# Patient Record
Sex: MALE | Race: BLACK OR AFRICAN AMERICAN | NOT HISPANIC OR LATINO
[De-identification: names, ages, dates, MRNs, and addresses within clinical notes are randomized per-mention and may not be internally consistent; named-entity substitution may affect disease eponyms.]

---

## 2018-08-07 PROBLEM — Z00.00 ENCOUNTER FOR PREVENTIVE HEALTH EXAMINATION: Status: ACTIVE | Noted: 2018-08-07

## 2018-09-04 ENCOUNTER — RX RENEWAL (OUTPATIENT)
Age: 73
End: 2018-09-04

## 2018-10-02 ENCOUNTER — APPOINTMENT (OUTPATIENT)
Dept: ENDOCRINOLOGY | Facility: CLINIC | Age: 73
End: 2018-10-02
Payer: COMMERCIAL

## 2018-10-02 VITALS
HEART RATE: 84 BPM | DIASTOLIC BLOOD PRESSURE: 78 MMHG | BODY MASS INDEX: 29.63 KG/M2 | SYSTOLIC BLOOD PRESSURE: 146 MMHG | HEIGHT: 70 IN | WEIGHT: 207 LBS

## 2018-10-02 DIAGNOSIS — Z86.69 PERSONAL HISTORY OF OTHER DISEASES OF THE NERVOUS SYSTEM AND SENSE ORGANS: ICD-10-CM

## 2018-10-02 DIAGNOSIS — Z86.19 PERSONAL HISTORY OF OTHER INFECTIOUS AND PARASITIC DISEASES: ICD-10-CM

## 2018-10-02 LAB
GLUCOSE BLDC GLUCOMTR-MCNC: 176
PODIATRY EVAL: NORMAL

## 2018-10-02 PROCEDURE — 82962 GLUCOSE BLOOD TEST: CPT

## 2018-10-02 PROCEDURE — 99214 OFFICE O/P EST MOD 30 MIN: CPT | Mod: 25

## 2018-10-02 PROCEDURE — 95251 CONT GLUC MNTR ANALYSIS I&R: CPT

## 2018-10-02 RX ORDER — HYDRALAZINE HYDROCHLORIDE 100 MG/1
100 TABLET ORAL 3 TIMES DAILY
Refills: 0 | Status: ACTIVE | COMMUNITY

## 2018-10-02 RX ORDER — AMLODIPINE BESYLATE 10 MG/1
10 TABLET ORAL DAILY
Refills: 0 | Status: ACTIVE | COMMUNITY

## 2018-10-05 ENCOUNTER — APPOINTMENT (OUTPATIENT)
Dept: ENDOCRINOLOGY | Facility: CLINIC | Age: 73
End: 2018-10-05

## 2018-10-09 ENCOUNTER — RESULT REVIEW (OUTPATIENT)
Age: 73
End: 2018-10-09

## 2018-11-27 ENCOUNTER — APPOINTMENT (OUTPATIENT)
Dept: ENDOCRINOLOGY | Facility: CLINIC | Age: 73
End: 2018-11-27
Payer: COMMERCIAL

## 2018-11-27 PROCEDURE — 95251 CONT GLUC MNTR ANALYSIS I&R: CPT

## 2018-11-27 PROCEDURE — G0108 DIAB MANAGE TRN  PER INDIV: CPT

## 2018-11-28 ENCOUNTER — RESULT CHARGE (OUTPATIENT)
Age: 73
End: 2018-11-28

## 2018-12-26 ENCOUNTER — APPOINTMENT (OUTPATIENT)
Dept: ENDOCRINOLOGY | Facility: CLINIC | Age: 73
End: 2018-12-26
Payer: COMMERCIAL

## 2018-12-26 PROCEDURE — G0108 DIAB MANAGE TRN  PER INDIV: CPT

## 2019-01-07 ENCOUNTER — RX RENEWAL (OUTPATIENT)
Age: 74
End: 2019-01-07

## 2019-01-08 ENCOUNTER — RECORD ABSTRACTING (OUTPATIENT)
Age: 74
End: 2019-01-08

## 2019-01-08 DIAGNOSIS — Z78.9 OTHER SPECIFIED HEALTH STATUS: ICD-10-CM

## 2019-01-08 DIAGNOSIS — Z83.3 FAMILY HISTORY OF DIABETES MELLITUS: ICD-10-CM

## 2019-01-16 ENCOUNTER — APPOINTMENT (OUTPATIENT)
Dept: ENDOCRINOLOGY | Facility: CLINIC | Age: 74
End: 2019-01-16
Payer: MEDICARE

## 2019-01-16 VITALS
WEIGHT: 212 LBS | DIASTOLIC BLOOD PRESSURE: 70 MMHG | HEART RATE: 69 BPM | BODY MASS INDEX: 30.35 KG/M2 | HEIGHT: 70 IN | SYSTOLIC BLOOD PRESSURE: 140 MMHG

## 2019-01-16 LAB — GLUCOSE BLDC GLUCOMTR-MCNC: 191

## 2019-01-16 PROCEDURE — 95251 CONT GLUC MNTR ANALYSIS I&R: CPT

## 2019-01-16 PROCEDURE — 99214 OFFICE O/P EST MOD 30 MIN: CPT | Mod: 25

## 2019-01-16 NOTE — HISTORY OF PRESENT ILLNESS
[FreeTextEntry1] : Feels well, no complaints today\par \par Current DM meds:\par Tresiba 20 units\par Novolog scale at meals- <100 +10 units, 101-150+12 units, 151-200+14 units, 201-250+16 units, 251-300 +18 units and over 300 +20 units\par \par \par SMBG: Izabel personal meter, see below\par \par Diet: trying to watch diet, decreased portion of starch/carb\par \par Exercise: weights\par \par Complications: (+) CKD, (+) retinopathy, (+) neuropathy\par following with Dr Hidalgo (renal) and Dr nely Villaseñor (retina) and podiatry (Dr Cannon)\par

## 2019-01-16 NOTE — PHYSICAL EXAM
[Alert] : alert [No Acute Distress] : no acute distress [Well Nourished] : well nourished [Well Developed] : well developed [Normal Sclera/Conjunctiva] : normal sclera/conjunctiva [EOMI] : extra ocular movement intact [Normal Rate and Effort] : normal respiratory rhythm and effort [Clear to Auscultation] : lungs were clear to auscultation bilaterally [Normal Rate] : heart rate was normal  [Normal S1, S2] : normal S1 and S2 [Normal Bowel Sounds] : normal bowel sounds [Not Tender] : non-tender [Soft] : abdomen soft [Not Distended] : not distended [Normal Gait] : normal gait [No Rash] : no rash [Cranial Nerves Intact] : cranial nerves 2-12 were intact [Normal Reflexes] : deep tendon reflexes were 2+ and symmetric [Oriented x3] : oriented to person, place, and time [Normal Insight/Judgement] : insight and judgment were intact [Normal Affect] : the affect was normal [Normal Mood] : the mood was normal [No Edema] : there was no peripheral edema

## 2019-01-16 NOTE — REVIEW OF SYSTEMS
[Visual Field Defect] : visual field defect [Blurry Vision] : blurred vision [Polyuria] : polyuria [Nocturia] : nocturia [Tremors] : tremors [Pain/Numbness of Digits] : pain/numbness of digits [Fatigue] : fatigue [Recent Weight Gain (___ Lbs)] : recent [unfilled] ~Ulb weight gain [Recent Weight Loss (___ Lbs)] : no recent weight loss [Chest Pain] : no chest pain [Palpitations] : no palpitations [Shortness Of Breath] : no shortness of breath [SOB on Exertion] : no shortness of breath during exertion [Nausea] : no nausea [Vomiting] : no vomiting was observed [Abdominal Pain] : no abdominal pain [Muscle Cramps] : no muscle cramps [Myalgia] : no myalgia  [Depression] : no depression [Anxiety] : no anxiety [Polydipsia] : no polydipsia [FreeTextEntry3] : floaters [FreeTextEntry8] : improving

## 2019-01-16 NOTE — ASSESSMENT
[FreeTextEntry1] : 1. 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, neuropathy and retinopathy - suboptimal glycemic control, Izabel personal interpretation: not scanning enough - incomplete data, occasional fasting hypoglycemia, hyperglycemia towards end of the day\par scan Izabel sensor more!!!\par Tresiba 16 units\par Novolog scale at Breakfast\par <100 +10 units\par 101-150+12 units, \par 151-200+14 units,\par 201-250+16 units,\par 251-300 +18 units \par over 300 +20 units\par \par Novolog scale at Lunch and Dinner\par <100 +14 units\par 101-150+16 units, \par 151-200+18 units,\par 201-250+20 units,\par 251-300 +22 units \par over 300 +24 units\par \par instructions printed for pt\par cont Izabel personal meter\par updated A1c\par \par 2. HLD - updated labs needed\par 3. HTN - cont BP meds, f/u renal

## 2019-01-21 ENCOUNTER — RX CHANGE (OUTPATIENT)
Age: 74
End: 2019-01-21

## 2019-01-21 RX ORDER — FLASH GLUCOSE SENSOR
KIT MISCELLANEOUS
Qty: 9 | Refills: 2 | Status: DISCONTINUED | COMMUNITY
Start: 2018-10-02 | End: 2019-01-21

## 2019-02-25 ENCOUNTER — RX RENEWAL (OUTPATIENT)
Age: 74
End: 2019-02-25

## 2019-04-29 ENCOUNTER — APPOINTMENT (OUTPATIENT)
Dept: ENDOCRINOLOGY | Facility: CLINIC | Age: 74
End: 2019-04-29
Payer: COMMERCIAL

## 2019-04-29 VITALS
HEART RATE: 70 BPM | HEIGHT: 70 IN | SYSTOLIC BLOOD PRESSURE: 140 MMHG | DIASTOLIC BLOOD PRESSURE: 74 MMHG | BODY MASS INDEX: 29.06 KG/M2 | WEIGHT: 203 LBS

## 2019-04-29 LAB — GLUCOSE BLDC GLUCOMTR-MCNC: 197

## 2019-04-29 PROCEDURE — 99214 OFFICE O/P EST MOD 30 MIN: CPT | Mod: 25

## 2019-04-29 PROCEDURE — 95251 CONT GLUC MNTR ANALYSIS I&R: CPT

## 2019-04-29 PROCEDURE — 82962 GLUCOSE BLOOD TEST: CPT

## 2019-07-15 ENCOUNTER — RX RENEWAL (OUTPATIENT)
Age: 74
End: 2019-07-15

## 2019-08-13 LAB
HBA1C MFR BLD HPLC: 8.7
LDLC SERPL DIRECT ASSAY-MCNC: 91

## 2019-08-14 ENCOUNTER — APPOINTMENT (OUTPATIENT)
Dept: ENDOCRINOLOGY | Facility: CLINIC | Age: 74
End: 2019-08-14
Payer: COMMERCIAL

## 2019-08-14 VITALS
DIASTOLIC BLOOD PRESSURE: 68 MMHG | SYSTOLIC BLOOD PRESSURE: 112 MMHG | HEIGHT: 70 IN | HEART RATE: 78 BPM | WEIGHT: 200 LBS | BODY MASS INDEX: 28.63 KG/M2 | OXYGEN SATURATION: 98 %

## 2019-08-14 LAB — GLUCOSE BLDC GLUCOMTR-MCNC: 274

## 2019-08-14 PROCEDURE — 36415 COLL VENOUS BLD VENIPUNCTURE: CPT

## 2019-08-14 PROCEDURE — 82962 GLUCOSE BLOOD TEST: CPT

## 2019-08-14 PROCEDURE — 99214 OFFICE O/P EST MOD 30 MIN: CPT | Mod: 25

## 2019-08-14 RX ORDER — RIVAROXABAN 20 MG/1
20 TABLET, FILM COATED ORAL DAILY
Refills: 0 | Status: DISCONTINUED | COMMUNITY
End: 2019-08-14

## 2019-08-14 NOTE — DATA REVIEWED
[FreeTextEntry1] : Labs 1/26/19\par A1c 8.6\par TSH 1.130\par urine microalb/Cr 25\par LDL 94\par \par Labs 5/6/19\par Gluc 193, A1c 8.7%\par Cr 1.41, GFR 50\par LDL 91, HDL 40, Tg 149

## 2019-08-14 NOTE — PHYSICAL EXAM
[Alert] : alert [No Acute Distress] : no acute distress [Well Nourished] : well nourished [Well Developed] : well developed [Normal Sclera/Conjunctiva] : normal sclera/conjunctiva [Normal Rate and Effort] : normal respiratory rhythm and effort [EOMI] : extra ocular movement intact [Clear to Auscultation] : lungs were clear to auscultation bilaterally [Normal Rate] : heart rate was normal  [Normal S1, S2] : normal S1 and S2 [No Edema] : there was no peripheral edema [Normal Bowel Sounds] : normal bowel sounds [Not Tender] : non-tender [Not Distended] : not distended [Soft] : abdomen soft [Normal Gait] : normal gait [No Rash] : no rash [Normal Reflexes] : deep tendon reflexes were 2+ and symmetric [Cranial Nerves Intact] : cranial nerves 2-12 were intact [Oriented x3] : oriented to person, place, and time [Normal Insight/Judgement] : insight and judgment were intact [Normal Affect] : the affect was normal [Normal Mood] : the mood was normal

## 2019-08-14 NOTE — REVIEW OF SYSTEMS
[Fatigue] : fatigue [Visual Field Defect] : visual field defect [Blurry Vision] : blurred vision [Polyuria] : polyuria [Nocturia] : nocturia [Tremors] : tremors [Pain/Numbness of Digits] : pain/numbness of digits [Stress] : stress [Recent Weight Gain (___ Lbs)] : no recent weight gain [Recent Weight Loss (___ Lbs)] : no recent weight loss [Chest Pain] : no chest pain [Palpitations] : no palpitations [Shortness Of Breath] : no shortness of breath [SOB on Exertion] : no shortness of breath during exertion [Nausea] : no nausea [Vomiting] : no vomiting was observed [Abdominal Pain] : no abdominal pain [Muscle Cramps] : no muscle cramps [Myalgia] : no myalgia  [Depression] : no depression [Anxiety] : no anxiety [Polydipsia] : no polydipsia [FreeTextEntry3] : floaters

## 2019-08-14 NOTE — ASSESSMENT
[FreeTextEntry1] : 1. 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 HLD - updated labs needed\par 3. HTN - cont BP meds

## 2019-08-14 NOTE — HISTORY OF PRESENT ILLNESS
[FreeTextEntry1] : recent cataract surgery\par \par Current DM meds:\par Tresiba 15-16 units\par Novolog scale at Breakfast, Lunch and DInner\par <100 +5 units\par 101-150 +10 units, \par 151-200+14 units,\par 201-250 +14-16 units,\par 251-300 +16-18 units \par over 300 +18 units\par \par SMBG: no logs/meter/no Izabel, FS higher lately, some 300's\par \par Diet BF 9-10 am, lunch 8pm. dinner 11:30 pm. has not been watching diet\par Exercise: weights\par \par Complications: (+) CKD, (+) retinopathy, (+) neuropathy\par following with Dr Hidalgo (renal) and Dr Anu Villaseñor (retina) and podiatry (Dr Cannon)\par

## 2019-08-15 ENCOUNTER — MEDICATION RENEWAL (OUTPATIENT)
Age: 74
End: 2019-08-15

## 2019-08-15 LAB
ALBUMIN SERPL ELPH-MCNC: 4.1 G/DL
ALP BLD-CCNC: 108 U/L
ALT SERPL-CCNC: 10 U/L
ANION GAP SERPL CALC-SCNC: 14 MMOL/L
AST SERPL-CCNC: 12 U/L
BILIRUB SERPL-MCNC: 0.3 MG/DL
BUN SERPL-MCNC: 13 MG/DL
CALCIUM SERPL-MCNC: 9.5 MG/DL
CHLORIDE SERPL-SCNC: 100 MMOL/L
CHOLEST SERPL-MCNC: 199 MG/DL
CHOLEST/HDLC SERPL: 5.4 RATIO
CO2 SERPL-SCNC: 28 MMOL/L
CREAT SERPL-MCNC: 1.29 MG/DL
ESTIMATED AVERAGE GLUCOSE: 229 MG/DL
GLUCOSE SERPL-MCNC: 296 MG/DL
HBA1C MFR BLD HPLC: 9.6 %
HDLC SERPL-MCNC: 37 MG/DL
LDLC SERPL CALC-MCNC: 135 MG/DL
POTASSIUM SERPL-SCNC: 4.8 MMOL/L
PROT SERPL-MCNC: 7.7 G/DL
SODIUM SERPL-SCNC: 142 MMOL/L
TRIGL SERPL-MCNC: 137 MG/DL

## 2019-08-22 ENCOUNTER — RESULT CHARGE (OUTPATIENT)
Age: 74
End: 2019-08-22

## 2019-09-03 ENCOUNTER — RX RENEWAL (OUTPATIENT)
Age: 74
End: 2019-09-03

## 2019-09-25 ENCOUNTER — RX RENEWAL (OUTPATIENT)
Age: 74
End: 2019-09-25

## 2019-12-02 ENCOUNTER — APPOINTMENT (OUTPATIENT)
Dept: ENDOCRINOLOGY | Facility: CLINIC | Age: 74
End: 2019-12-02

## 2019-12-09 ENCOUNTER — APPOINTMENT (OUTPATIENT)
Dept: ENDOCRINOLOGY | Facility: CLINIC | Age: 74
End: 2019-12-09

## 2020-02-05 ENCOUNTER — RX RENEWAL (OUTPATIENT)
Age: 75
End: 2020-02-05

## 2020-02-20 ENCOUNTER — RX RENEWAL (OUTPATIENT)
Age: 75
End: 2020-02-20

## 2020-03-09 LAB — HBA1C MFR BLD HPLC: 10.7

## 2020-03-10 ENCOUNTER — APPOINTMENT (OUTPATIENT)
Dept: ENDOCRINOLOGY | Facility: CLINIC | Age: 75
End: 2020-03-10
Payer: MEDICARE

## 2020-03-10 ENCOUNTER — RX RENEWAL (OUTPATIENT)
Age: 75
End: 2020-03-10

## 2020-03-10 VITALS
WEIGHT: 214 LBS | DIASTOLIC BLOOD PRESSURE: 68 MMHG | SYSTOLIC BLOOD PRESSURE: 122 MMHG | HEIGHT: 70 IN | BODY MASS INDEX: 30.64 KG/M2 | HEART RATE: 76 BPM

## 2020-03-10 LAB — GLUCOSE BLDC GLUCOMTR-MCNC: 250

## 2020-03-10 PROCEDURE — 82962 GLUCOSE BLOOD TEST: CPT

## 2020-03-10 PROCEDURE — 99214 OFFICE O/P EST MOD 30 MIN: CPT | Mod: 25

## 2020-03-10 NOTE — PHYSICAL EXAM
[Alert] : alert [No Acute Distress] : no acute distress [Well Nourished] : well nourished [Well Developed] : well developed [Normal Sclera/Conjunctiva] : normal sclera/conjunctiva [Normal Hearing] : hearing was normal [Supple] : the neck was supple [Normal Rate and Effort] : normal respiratory rhythm and effort [No Accessory Muscle Use] : no accessory muscle use [Clear to Auscultation] : lungs were clear to auscultation bilaterally [Normal Rate] : heart rate was normal  [Normal S1, S2] : normal S1 and S2 [Regular Rhythm] : with a regular rhythm [No Edema] : there was no peripheral edema [Not Tender] : non-tender [Soft] : abdomen soft [Post Cervical Nodes] : posterior cervical nodes [Anterior Cervical Nodes] : anterior cervical nodes [Normal] : normal and non tender [Normal Gait] : normal gait [No Rash] : no rash [Acanthosis Nigricans] : no acanthosis nigricans [No Tremors] : no tremors [Oriented x3] : oriented to person, place, and time

## 2020-03-10 NOTE — ASSESSMENT
[FreeTextEntry1] : T2DM\par -Izabel to be downloaded tomorrow it is clear that there needs to be insulin changes yet unsure of what time of day his problems are\par -Would benefit from a CDE apt as he is a vegetarian and has limited options and leads a carb heavy diet\par -Increase exercise as tolerated, goal of 30 mins a day 5x a week\par -Continue follow up with ophtho, podiatry, and renal\par \par HTN\par Continue regimen \par BP stable in office \par \par HLD\par Continue statin, fasting labs to be done\par \par Pt to come to office tomorrow for izabel download and fasting labs\par RTO in 2 months

## 2020-03-10 NOTE — HISTORY OF PRESENT ILLNESS
[FreeTextEntry1] : T2DM\par Severity: uncontrolled \par Duration: about 10-12 years\par Modifying Factors: on insulin for approx 5 years \par \par SMBG\par Has maday but did not bring  so unable to do download \par FS in office 250- had cup of tea and pastry for breakfast \par \par Endorses lows after he exercises, as low as 70 \par \par A1C worse 10.7\par \par Current drug regimen\par Tresiba 20 units daily\par Novolog scale at Breakfast, Lunch and DInner\par <100 +5 units\par 101-150 +10 units, \par 151-200+14 units,\par 201-250 +14-16 units,\par 251-300 +16-18 units \par over 300 +18 units\par \par Eye exam: recently saw ophtho 2/2020, +retinopathy, +cataract surgery \par Foot exam: last visit 2019- endorses nueorpahty, takes gabapentin \par Kidney disease: +CKD, following with Dr. Hidalgo \par Heart disease: sees cardiology \par following with Dr Hidalgo (renal) and Dr Anu Villaseñor (retina) and podiatry (Dr Cannon)\par \par \par Weight: gained 14 lbs since Aug\par Diet: pt cooks\par B: [place] toast\par L: fish sandwhich, tuna fish, potatoes \par D: fish, starch, veggies\par S: chips, pastry\par D: Diet coke, water, tea \par pt is vegetarian so his diet is starch heavy\par Exercise: walks on treadmill\par \par Smoking: denies \par \par HTN\par BP in office 122/68\par Amlodipine 10 mg daily\par Hydralazine 100 mg TID\par \par HLD\par Atorvastatin 10 mg daily-unsure if he is taking

## 2020-03-10 NOTE — REVIEW OF SYSTEMS
[Fatigue] : fatigue [Recent Weight Gain (___ Lbs)] : recent [unfilled] ~Ulb weight gain [Visual Field Defect] : visual field defect [Blurry Vision] : blurred vision [Diarrhea] : diarrhea [Polyuria] : polyuria [Nocturia] : nocturia [Tremors] : tremors [Depression] : depression [Anxiety] : anxiety [Dysphagia] : no dysphagia [Dysphonia] : no dysphonia [Neck Pain] : no neck pain [Chest Pain] : no chest pain [Palpitations] : no palpitations [Shortness Of Breath] : no shortness of breath [Nausea] : no nausea [Vomiting] : no vomiting was observed [Constipation] : no constipation [Joint Pain] : no joint pain [Joint Stiffness] : no joint stiffness [Acanthosis] : no acanthosis  [Dry Skin] : no dry skin [Headache] : no headaches [Polydipsia] : no polydipsia [Cold Intolerance] : cold tolerant [Heat Intolerance] : heat tolerant

## 2020-03-11 ENCOUNTER — APPOINTMENT (OUTPATIENT)
Dept: ENDOCRINOLOGY | Facility: CLINIC | Age: 75
End: 2020-03-11
Payer: MEDICARE

## 2020-03-11 DIAGNOSIS — I26.99 OTHER PULMONARY EMBOLISM W/OUT ACUTE COR PULMONALE: ICD-10-CM

## 2020-03-11 PROCEDURE — 36415 COLL VENOUS BLD VENIPUNCTURE: CPT

## 2020-03-12 LAB
25(OH)D3 SERPL-MCNC: 29.5 NG/ML
ALBUMIN SERPL ELPH-MCNC: 4.2 G/DL
ALP BLD-CCNC: 105 U/L
ALT SERPL-CCNC: 10 U/L
ANION GAP SERPL CALC-SCNC: 12 MMOL/L
AST SERPL-CCNC: 12 U/L
BASOPHILS # BLD AUTO: 0.04 K/UL
BASOPHILS NFR BLD AUTO: 0.4 %
BILIRUB SERPL-MCNC: 0.2 MG/DL
BUN SERPL-MCNC: 17 MG/DL
CALCIUM SERPL-MCNC: 8.9 MG/DL
CHLORIDE SERPL-SCNC: 99 MMOL/L
CHOLEST SERPL-MCNC: 125 MG/DL
CHOLEST/HDLC SERPL: 2.9 RATIO
CO2 SERPL-SCNC: 30 MMOL/L
CREAT SERPL-MCNC: 1.54 MG/DL
EOSINOPHIL # BLD AUTO: 0.25 K/UL
EOSINOPHIL NFR BLD AUTO: 2.8 %
GLUCOSE SERPL-MCNC: 235 MG/DL
HCT VFR BLD CALC: 46.8 %
HDLC SERPL-MCNC: 44 MG/DL
HGB BLD-MCNC: 13 G/DL
IMM GRANULOCYTES NFR BLD AUTO: 0.2 %
LDLC SERPL CALC-MCNC: 60 MG/DL
LYMPHOCYTES # BLD AUTO: 1.74 K/UL
LYMPHOCYTES NFR BLD AUTO: 19.6 %
MAN DIFF?: NORMAL
MCHC RBC-ENTMCNC: 21.4 PG
MCHC RBC-ENTMCNC: 27.8 GM/DL
MCV RBC AUTO: 77.1 FL
MONOCYTES # BLD AUTO: 0.39 K/UL
MONOCYTES NFR BLD AUTO: 4.4 %
NEUTROPHILS # BLD AUTO: 6.45 K/UL
NEUTROPHILS NFR BLD AUTO: 72.6 %
PLATELET # BLD AUTO: 334 K/UL
POTASSIUM SERPL-SCNC: 3.9 MMOL/L
PROT SERPL-MCNC: 7.6 G/DL
RBC # BLD: 6.07 M/UL
RBC # FLD: 17.4 %
SODIUM SERPL-SCNC: 140 MMOL/L
T3FREE SERPL-MCNC: 2.67 PG/ML
T4 FREE SERPL-MCNC: 1.2 NG/DL
TRIGL SERPL-MCNC: 105 MG/DL
TSH SERPL-ACNC: 2.17 UIU/ML
VIT B12 SERPL-MCNC: 659 PG/ML
WBC # FLD AUTO: 8.89 K/UL

## 2020-03-19 ENCOUNTER — RX RENEWAL (OUTPATIENT)
Age: 75
End: 2020-03-19

## 2020-05-09 ENCOUNTER — APPOINTMENT (OUTPATIENT)
Dept: ENDOCRINOLOGY | Facility: CLINIC | Age: 75
End: 2020-05-09
Payer: MEDICARE

## 2020-05-09 PROCEDURE — 99442: CPT | Mod: CR

## 2020-05-09 RX ORDER — LIFITEGRAST 50 MG/ML
5 SOLUTION/ DROPS OPHTHALMIC
Qty: 180 | Refills: 0 | Status: ACTIVE | COMMUNITY
Start: 2020-03-12

## 2020-05-09 RX ORDER — BRIMONIDINE TARTRATE, TIMOLOL MALEATE 2; 5 MG/ML; MG/ML
0.2-0.5 SOLUTION/ DROPS OPHTHALMIC
Qty: 5 | Refills: 0 | Status: ACTIVE | COMMUNITY
Start: 2020-04-09

## 2020-05-09 RX ORDER — RIVAROXABAN 10 MG/1
10 TABLET, FILM COATED ORAL
Qty: 30 | Refills: 0 | Status: ACTIVE | COMMUNITY
Start: 2020-03-27

## 2020-05-09 NOTE — REVIEW OF SYSTEMS
[Blurred Vision] : blurred vision [Nocturia] : nocturia [Pain/Numbness of Digits] : pain/numbness of digits [Recent Weight Gain (___ Lbs)] : no recent weight gain [Recent Weight Loss (___ Lbs)] : no recent weight loss [Visual Field Defect] : no visual field defect [Chest Pain] : no chest pain [Palpitations] : no palpitations [Shortness Of Breath] : no shortness of breath [SOB on Exertion] : no shortness of breath on exertion [Nausea] : no nausea [Abdominal Pain] : no abdominal pain [Vomiting] : no vomiting [Polyuria] : no polyuria [Depression] : no depression [Polydipsia] : no polydipsia

## 2020-05-09 NOTE — HISTORY OF PRESENT ILLNESS
[FreeTextEntry1] : T2DM\par Severity: uncontrolled \par Duration: about 10-12 years\par Modifying Factors: on insulin for approx 5 years \par \par SMBG using Izabel, no DL in EMR. per pt FS running "fair", 300's on occasion\par \par Current drug regimen\par Tresiba 25 units daily\par Novolog scale at Breakfast, Lunch and DInner\par 101-150 14\par 151-200 18\par 201-250 20\par 251-300 24\par 301+ 26\par \par Complications: (+) CKD follows with Dr Hidalgo, (+) retinopathy follows Dr Villaseñor (retina), (+) neuropathy on gabapentin by Kassandra\par Diet - vegetarian, eats starches, occ fish, mostly vegetables\par Exercise: free weight\par \par HTN\par Amlodipine 10 mg daily\par Hydralazine 100 mg TID\par \par HLD\par Atorvastatin 10 mg daily-unsure if he is taking \par \par vit D def - on vit D load

## 2020-05-09 NOTE — REASON FOR VISIT
[Verbal consent obtained from patient] : the patient, [unfilled] [Follow - Up] : a follow-up visit [DM Type 2] : DM Type 2 [Other___] : [unfilled]

## 2020-05-09 NOTE — ASSESSMENT
[FreeTextEntry1] : T2DM comp CKD, retinopathy and neuropathy - improved control by history\par -Izabel to be downloaded in office next week\par -cont current insulin regime, will adjust doses based sensor data\par - labs next month, pt scared to go o lab at this time\par \par HTN\par Continue regimen \par \par HLD\par Continue statin, fasting labs to be done next month\par \par Vit d def - on vit D load, check levels, can resume OTC daiy vit D3 when load finished

## 2020-05-21 ENCOUNTER — RX RENEWAL (OUTPATIENT)
Age: 75
End: 2020-05-21

## 2020-06-17 ENCOUNTER — RX RENEWAL (OUTPATIENT)
Age: 75
End: 2020-06-17

## 2020-09-08 ENCOUNTER — RX RENEWAL (OUTPATIENT)
Age: 75
End: 2020-09-08

## 2020-09-24 ENCOUNTER — RX RENEWAL (OUTPATIENT)
Age: 75
End: 2020-09-24

## 2020-10-06 ENCOUNTER — APPOINTMENT (OUTPATIENT)
Dept: ENDOCRINOLOGY | Facility: CLINIC | Age: 75
End: 2020-10-06
Payer: MEDICARE

## 2020-10-06 VITALS
WEIGHT: 206 LBS | HEART RATE: 78 BPM | DIASTOLIC BLOOD PRESSURE: 78 MMHG | SYSTOLIC BLOOD PRESSURE: 120 MMHG | HEIGHT: 70 IN | OXYGEN SATURATION: 94 % | BODY MASS INDEX: 29.49 KG/M2

## 2020-10-06 LAB — GLUCOSE BLDC GLUCOMTR-MCNC: 273

## 2020-10-06 PROCEDURE — 95251 CONT GLUC MNTR ANALYSIS I&R: CPT

## 2020-10-06 PROCEDURE — 99214 OFFICE O/P EST MOD 30 MIN: CPT | Mod: 25

## 2020-10-06 PROCEDURE — 82962 GLUCOSE BLOOD TEST: CPT

## 2020-10-06 NOTE — HISTORY OF PRESENT ILLNESS
[FreeTextEntry1] : Interval Hx - past 8 week pulled leg muscle, on muscle relaxant and pain killers\par \par T2DM\par Severity: uncontrolled \par Duration: 15+ years\par Modifying Factors: worse due to diet - eats lots of carbs, vegetarian diet\par \par SMBG: CGM\par CGM downloaded and reviewed: Izabel\par Average glucose: 244\par % time CGM active: 60\par Glucose variability (target <36%): 25\par \par % VERYHIGH: 42\par % HIGH: 45\par % TARGET: 13\par % LOW: 0\par % VERY LOW: 0\par \par Interpretation: not scanning enough, persistant hyperglycemia.\par \par \par Current drug regimen: adherent\par Tresiba 35 units daily\par Novolog scale at Breakfast, Lunch and DInner\par 101-150 14\par 151-200 18\par 201-250 20\par 251-300 24\par 301+ 26\par \par Complications: (+) CKD follows with Dr Hidalgo, (+) retinopathy follows Dr Villaseñor (retina), (+) neuropathy on gabapentin by Kassandra. no CAD - follows w cardio\par Diet - vegetarian, eats starches, occ fish, mostly vegetables\par Exercise: free weight\par \par HTN\par Amlodipine 10 mg daily\par Hydralazine 100 mg TID\par \par HLD\par Atorvastatin 10 mg daily-adherent\par \par vit D def - on vit D load

## 2020-10-06 NOTE — ASSESSMENT
[FreeTextEntry1] : T2DM comp CKD, retinopathy and neuropathy - poorly controlled, significant and peristent hyperglycemia on Izabel CGM\par - cont Izabel\par - increase Tresiba 45 units\par - increase prandial Novolog  18 units, 151-200 22, 201-250 28, 251-300 32, 301+ 36\par - labs needed\par - close follow w NP in 4-6 weeks\par \par HTN\par Continue regimen \par \par HLD\par Continue statin, fasting labs to be done next month\par \par Vit d def - on vit D load, check levels, can resume OTC daiy vit D3 when load finished [Retinopathy Screening] : Patient was referred to ophthalmology for retinopathy screening

## 2020-10-06 NOTE — PHYSICAL EXAM
[Alert] : alert [Well Nourished] : well nourished [Healthy Appearance] : healthy appearance [No Acute Distress] : no acute distress [EOMI] : extra ocular movement intact [No LAD] : no lymphadenopathy [Thyroid Not Enlarged] : the thyroid was not enlarged [No Thyroid Nodules] : no palpable thyroid nodules [No Accessory Muscle Use] : no accessory muscle use [Clear to Auscultation] : lungs were clear to auscultation bilaterally [Normal S1, S2] : normal S1 and S2 [Normal Rate] : heart rate was normal [No Edema] : no peripheral edema [Normal Bowel Sounds] : normal bowel sounds [Not Tender] : non-tender [Soft] : abdomen soft [Right Foot Was Examined] : right foot ~C was examined [Left Foot Was Examined] : left foot ~C was examined [2+] : 2+ in the dorsalis pedis [Cranial Nerves Intact] : cranial nerves 2-12 were intact [Oriented x3] : oriented to person, place, and time [Normal Affect] : the affect was normal [Normal Insight/Judgement] : insight and judgment were intact [Normal Mood] : the mood was normal [Foot Ulcers] : no foot ulcers [Vibration Dec.] : normal vibratory sensation at the level of the toes [Position Sense Dec.] : normal position sense at the level of the toes [Diminished Throughout Both Feet] : normal tactile sensation with monofilament testing throughout both feet

## 2020-10-28 ENCOUNTER — NON-APPOINTMENT (OUTPATIENT)
Age: 75
End: 2020-10-28

## 2020-11-17 LAB
HBA1C MFR BLD HPLC: 10.2
LDLC SERPL DIRECT ASSAY-MCNC: 116
MICROALBUMIN/CREAT 24H UR-RTO: 6

## 2020-11-18 ENCOUNTER — APPOINTMENT (OUTPATIENT)
Dept: ENDOCRINOLOGY | Facility: CLINIC | Age: 75
End: 2020-11-18
Payer: MEDICARE

## 2020-11-18 VITALS
WEIGHT: 210 LBS | BODY MASS INDEX: 30.06 KG/M2 | HEART RATE: 75 BPM | HEIGHT: 70 IN | SYSTOLIC BLOOD PRESSURE: 142 MMHG | DIASTOLIC BLOOD PRESSURE: 88 MMHG

## 2020-11-18 LAB — GLUCOSE BLDC GLUCOMTR-MCNC: 377

## 2020-11-18 PROCEDURE — 99214 OFFICE O/P EST MOD 30 MIN: CPT | Mod: 25

## 2020-11-18 PROCEDURE — 82962 GLUCOSE BLOOD TEST: CPT

## 2020-11-18 NOTE — REVIEW OF SYSTEMS
[Fatigue] : fatigue [Diarrhea] : diarrhea [Decreased Appetite] : appetite not decreased [Recent Weight Gain (___ Lbs)] : no recent weight gain [Recent Weight Loss (___ Lbs)] : no recent weight loss [Visual Field Defect] : no visual field defect [Blurred Vision] : no blurred vision [Dysphagia] : no dysphagia [Neck Pain] : no neck pain [Dysphonia] : no dysphonia [Chest Pain] : no chest pain [Palpitations] : no palpitations [Constipation] : no constipation [Polyuria] : no polyuria [Dysuria] : no dysuria [Headaches] : no headaches [Tremors] : no tremors [Depression] : no depression [Anxiety] : no anxiety [Polydipsia] : no polydipsia [Swelling] : no swelling [FreeTextEntry2] : weight stable  [FreeTextEntry3] : improving  [FreeTextEntry7] : seeing GI and having colonoscopy 12/16/20

## 2020-11-18 NOTE — HISTORY OF PRESENT ILLNESS
[FreeTextEntry1] : Interval Hx - past 8 week pulled leg muscle, on muscle relaxant and pain killers\par \par Type 2 DM\par Severity: uncontrolled \par Duration: 15+ years\par Modifying Factors: worse due to diet - eats lots of carbs, vegetarian diet\par \par SMBG: pt. reports this morning 209 took Novolog 22 units before breakfast, then had a butter roll with tea (1 hour),  now \par CGM\par CGM: Izabel unable to download due to no reader \par \par Interpretation: not scanning enough, persistent hyperglycemia.\par \par \par Current drug regimen: adherent\par Tresiba 30-35 units daily\par Novolog scale at Breakfast, Lunch and DInner\par Novolog  18 units, 151-200 22, 201-250 28, 251-300 32, 301+ 36\par \par Complications: (+) CKD follows with Dr Hidalgo, (+) retinopathy follows Dr Villaseñor (retina), (+) neuropathy on gabapentin by Kassandra. no \par Eye appointment this week \par CAD - follows w cardio\par Diet - vegetarian, eats starches, occ fish, mostly vegetables\par Exercise: free weight\par \par HTN\par Amlodipine 10 mg daily\par Hydralazine 100 mg TID\par \par HLD\par Atorvastatin 10 mg daily-adherent\par \par vit D def - on vit D 50,000 units once a week

## 2020-11-18 NOTE — PHYSICAL EXAM
[Alert] : alert [Well Nourished] : well nourished [No Acute Distress] : no acute distress [Well Developed] : well developed [Normal Sclera/Conjunctiva] : normal sclera/conjunctiva [EOMI] : extra ocular movement intact [No LAD] : no lymphadenopathy [Thyroid Not Enlarged] : the thyroid was not enlarged [No Thyroid Nodules] : no palpable thyroid nodules [No Respiratory Distress] : no respiratory distress [Normal Rate and Effort] : normal respiratory rate and effort [Clear to Auscultation] : lungs were clear to auscultation bilaterally [Normal S1, S2] : normal S1 and S2 [Normal Rate] : heart rate was normal [Regular Rhythm] : with a regular rhythm [No Edema] : no peripheral edema [Normal Bowel Sounds] : normal bowel sounds [Not Tender] : non-tender [Soft] : abdomen soft [Normal Gait] : normal gait [No Rash] : no rash [No Tremors] : no tremors [Oriented x3] : oriented to person, place, and time [Normal Insight/Judgement] : insight and judgment were intact [Normal Mood] : the mood was normal [Acanthosis Nigricans] : no acanthosis nigricans [de-identified] : Pt. declined foot examination

## 2020-11-18 NOTE — ASSESSMENT
[FreeTextEntry1] : Type 2 DM comp CKD, retinopathy and neuropathy - poorly controlled, unable to download izabel due to patient forgetting reader \par - pt. will bring in reader later today \par - continue Izabel\par - pt. does not want to increase Tresiba due to afraid of going low blood sugars  \par - continue current Rx until izabel is downloaded \par - repeat A1C in 2 months \par \par HTN\par Continue regimen \par \par HLD\par Continue statin\par - check lipids in 2 months \par \par Vit d def - on vit D load, check levels, can resume OTC daiy vit D3 when load finished. \par \par RTO in 4 weeks.

## 2020-12-21 ENCOUNTER — APPOINTMENT (OUTPATIENT)
Dept: ENDOCRINOLOGY | Facility: CLINIC | Age: 75
End: 2020-12-21
Payer: MEDICARE

## 2020-12-21 VITALS
WEIGHT: 210 LBS | HEART RATE: 70 BPM | SYSTOLIC BLOOD PRESSURE: 130 MMHG | DIASTOLIC BLOOD PRESSURE: 86 MMHG | BODY MASS INDEX: 30.06 KG/M2 | HEIGHT: 70 IN

## 2020-12-21 PROCEDURE — 99214 OFFICE O/P EST MOD 30 MIN: CPT | Mod: 25

## 2020-12-21 PROCEDURE — 95251 CONT GLUC MNTR ANALYSIS I&R: CPT

## 2020-12-21 NOTE — PHYSICAL EXAM
[Alert] : alert [Well Nourished] : well nourished [No Acute Distress] : no acute distress [Well Developed] : well developed [Normal Sclera/Conjunctiva] : normal sclera/conjunctiva [EOMI] : extra ocular movement intact [No LAD] : no lymphadenopathy [Thyroid Not Enlarged] : the thyroid was not enlarged [No Thyroid Nodules] : no palpable thyroid nodules [No Respiratory Distress] : no respiratory distress [Normal Rate and Effort] : normal respiratory rate and effort [Clear to Auscultation] : lungs were clear to auscultation bilaterally [Normal S1, S2] : normal S1 and S2 [Normal Rate] : heart rate was normal [Regular Rhythm] : with a regular rhythm [No Edema] : no peripheral edema [Normal Bowel Sounds] : normal bowel sounds [Not Tender] : non-tender [Soft] : abdomen soft [Normal Gait] : normal gait [No Rash] : no rash [No Tremors] : no tremors [Oriented x3] : oriented to person, place, and time [Normal Insight/Judgement] : insight and judgment were intact [Normal Mood] : the mood was normal [Acanthosis Nigricans] : no acanthosis nigricans [de-identified] : Pt. declined foot examination

## 2020-12-21 NOTE — HISTORY OF PRESENT ILLNESS
[FreeTextEntry1] : Interval Hx - past 8 week pulled leg muscle, on muscle relaxant and pain killers\par \par Type 2 DM\par Severity: uncontrolled \par Duration: 15+ years\par Modifying Factors: worse due to diet - eats lots of carbs, vegetarian diet\par \par SMBG: \par CGM: average glucose 243, GMI 9.1%, 81% over 180, 17% in target range, and <1% less than 70 \par Current  - scanned \par \par Interpretation: not scanning enough, persistent hyperglycemia.\par \par \par Current drug regimen: adherent\par Tresiba 34 units at HS at times does not give insulin if blood sugar under 150 \par Novolog scale at Breakfast, Lunch and DInner\par Novolog  18 units, 151-200 22, 201-250 28, 251-300 32, 301+ 36\par \par Complications: (+) CKD follows with Dr Hidalgo, (+) retinopathy follows Dr Villaseñor (retina), (+) neuropathy on gabapentin by Kassandra. no \par Eye appointment this week \par CAD - follows w cardio\par Diet - vegetarian, eats starches, occ fish, mostly vegetables\par Exercise: free weight\par \par Labs: \par 10/16/20\par A1C 10.2%\par Creatinine 1.48\par GFR 47\par Cholesterol 196\par Triglycerides 185\par Vitamin D 26.9\par \par HTN\par Amlodipine 10 mg daily\par Hydralazine 100 mg TID\par \par HLD\par Atorvastatin 10 mg daily-adherent\par \par vit D def - on vit D 50,000 units once a week

## 2020-12-21 NOTE — ASSESSMENT
[FreeTextEntry1] : Type 2 DM comp CKD, retinopathy and neuropathy - poorly controlled in early am due to omitting Tresiba at bedtime\par - educated on the importance of giving Tresiba every night, increase Tresiba to 36 units \par - bring in Izabel in 1 week for DL\par - continue Izabel\par - repeat A1C in 1 month before next visit  \par \par HTN: BP acceptable \par Continue regimen \par \par HLD\par Continue statin\par - check lipids in 2 months \par \par Vit d def - on vit D load, check levels, can resume OTC daiy vit D3 when load finished. \par \par RTO in 4 weeks with Dr. Ashford

## 2020-12-21 NOTE — REVIEW OF SYSTEMS
[Fatigue] : fatigue [Blurred Vision] : blurred vision [Diarrhea] : diarrhea [Decreased Appetite] : appetite not decreased [Recent Weight Gain (___ Lbs)] : no recent weight gain [Recent Weight Loss (___ Lbs)] : no recent weight loss [Visual Field Defect] : no visual field defect [Dysphagia] : no dysphagia [Neck Pain] : no neck pain [Dysphonia] : no dysphonia [Chest Pain] : no chest pain [Palpitations] : no palpitations [Constipation] : no constipation [Polyuria] : no polyuria [Dysuria] : no dysuria [Headaches] : no headaches [Tremors] : no tremors [Depression] : no depression [Anxiety] : no anxiety [Polydipsia] : no polydipsia [Swelling] : no swelling [FreeTextEntry2] : weight stable  [FreeTextEntry3] : due cataracts  [FreeTextEntry7] : has colonoscopy dome recently

## 2021-01-08 ENCOUNTER — NON-APPOINTMENT (OUTPATIENT)
Age: 76
End: 2021-01-08

## 2021-01-12 ENCOUNTER — APPOINTMENT (OUTPATIENT)
Dept: ENDOCRINOLOGY | Facility: CLINIC | Age: 76
End: 2021-01-12
Payer: MEDICARE

## 2021-01-12 VITALS
WEIGHT: 210 LBS | OXYGEN SATURATION: 99 % | SYSTOLIC BLOOD PRESSURE: 120 MMHG | HEART RATE: 81 BPM | HEIGHT: 70 IN | BODY MASS INDEX: 30.06 KG/M2 | DIASTOLIC BLOOD PRESSURE: 80 MMHG

## 2021-01-12 LAB — GLUCOSE BLDC GLUCOMTR-MCNC: 190

## 2021-01-12 PROCEDURE — 82962 GLUCOSE BLOOD TEST: CPT

## 2021-01-12 PROCEDURE — 99214 OFFICE O/P EST MOD 30 MIN: CPT | Mod: 25

## 2021-01-20 ENCOUNTER — RX RENEWAL (OUTPATIENT)
Age: 76
End: 2021-01-20

## 2021-02-09 NOTE — REVIEW OF SYSTEMS
[Blurred Vision] : blurred vision [Dysphagia] : no dysphagia [Neck Pain] : no neck pain [Dysphonia] : no dysphonia [Chest Pain] : no chest pain [Abdominal Pain] : no abdominal pain [Polyuria] : no polyuria [Nocturia] : no nocturia [Depression] : no depression [Anxiety] : no anxiety [Polydipsia] : no polydipsia [FreeTextEntry2] : weight stable

## 2021-02-09 NOTE — HISTORY OF PRESENT ILLNESS
[FreeTextEntry1] : Interval Hx - no issues, stopped using Izabel bc pharmacy/insurance\par \par Type 2 DM\par Severity: uncontrolled \par Duration: 15+ years\par Modifying Factors: worse due to diet , worse due to nonadherence w Tresiba at bedtime\par \par SMBG: stopped testing b/c he does not have sensor\par Izabel DL 1/7/21 persistant hyperglycemia, avg gluc 224, very high 31%, high 45%, target 23%, low 1%\par \par Current drug regimen: adherent\par Tresiba 36 units at HS at times does not give insulin if blood sugar under 150 \par Novolog scale at Breakfast, Lunch and DInner\par Novolog  18 units, 151-200 22, 201-250 28, 251-300 32, 301+ 36\par \par Complications: \par (+) CKD follows with Dr Hidalgo\par (+) retinopathy follows Dr Villaseñor (retina)\par  (+) neuropathy on gabapentin by Kassandra.\par CAD - follows w cardio\par \par Diet - vegetarian, eats starches, occ fish, mostly vegetables\par Exercise: free weight\par \par HLD\par on Atorvastatin 10 mg daily-adherent\par \par vit D def - on vit D 50,000 units once a week

## 2021-02-09 NOTE — PHYSICAL EXAM
[Alert] : alert [Well Nourished] : well nourished [No Acute Distress] : no acute distress [Well Developed] : well developed [Normal Sclera/Conjunctiva] : normal sclera/conjunctiva [EOMI] : extra ocular movement intact [No LAD] : no lymphadenopathy [Thyroid Not Enlarged] : the thyroid was not enlarged [No Thyroid Nodules] : no palpable thyroid nodules [No Respiratory Distress] : no respiratory distress [Normal Rate and Effort] : normal respiratory rate and effort [Clear to Auscultation] : lungs were clear to auscultation bilaterally [Normal S1, S2] : normal S1 and S2 [Normal Rate] : heart rate was normal [Regular Rhythm] : with a regular rhythm [No Edema] : no peripheral edema [Normal Bowel Sounds] : normal bowel sounds [Soft] : abdomen soft [Normal Gait] : normal gait [No Rash] : no rash [No Tremors] : no tremors [Oriented x3] : oriented to person, place, and time [Normal Insight/Judgement] : insight and judgment were intact [Normal Mood] : the mood was normal [Acanthosis Nigricans] : no acanthosis nigricans [de-identified] : Pt. declined foot examination

## 2021-02-09 NOTE — ASSESSMENT
[FreeTextEntry1] : Type 2 DM comp CKD, retinopathy and neuropathy - poorly controlled, has not been taking tresiba due to fear of hypoglycemia\par - will need to resolve issue w insurance and pharmacy and get pt back onLibre CGM, in meantime he was advised to use glucometer and test FS\par - educated on the importance of giving Tresiba every night, increase Tresiba to 38 unitd, if FS at bedtime <120 can have bedtime snack but should proceed w treiba injection\par - repeat A1C needed\par - cont prandial insulin scale\par \par HTN: BP acceptable \par Continue current medications\par \par HLD\par Continue statin\par repeat lipid panel\par \par Vit d def -sp vit D load, check levels, can resume OTC daiy vit D3 when load finished. \par \par labs from renal reviewed, but more labs needed\par \par Glucose Sensor Necessity:  This patient with diabetes (dx:  E11.65) This patient is currently using the Izabel continuous glucose monitor.  The patient is treated with insulin via 3 or more injections daily.   This patient is adjusting their blood glucose based on data from the continuous glucose monitor and uses an insulin sliding scale In addition, the patient has been to our office for an evaluation of their diabetes control within the past 6 months.\par

## 2021-02-09 NOTE — ADDENDUM
[FreeTextEntry1] : Glucose Sensor Necessity:  This patient with diabetes (dx: E11.65)  This patient is currently using a  Izabel continuous glucose monitor. The patient is treated with insulin via 3 or more injections daily.  This patient is adjusting their blood glucose based on data from the continuous glucose monitor. In addition, the patient has been to our office for an evaluation of their diabetes control within the past 6 months.\par

## 2021-02-22 ENCOUNTER — APPOINTMENT (OUTPATIENT)
Dept: ENDOCRINOLOGY | Facility: CLINIC | Age: 76
End: 2021-02-22
Payer: MEDICARE

## 2021-02-22 VITALS
HEART RATE: 76 BPM | SYSTOLIC BLOOD PRESSURE: 138 MMHG | DIASTOLIC BLOOD PRESSURE: 86 MMHG | HEIGHT: 70 IN | BODY MASS INDEX: 31.07 KG/M2 | WEIGHT: 217 LBS

## 2021-02-22 LAB
GLUCOSE BLDC GLUCOMTR-MCNC: 235
HBA1C MFR BLD HPLC: 9.2

## 2021-02-22 PROCEDURE — 82962 GLUCOSE BLOOD TEST: CPT

## 2021-02-22 PROCEDURE — 95251 CONT GLUC MNTR ANALYSIS I&R: CPT

## 2021-02-22 PROCEDURE — 83036 HEMOGLOBIN GLYCOSYLATED A1C: CPT | Mod: QW

## 2021-02-22 PROCEDURE — 99214 OFFICE O/P EST MOD 30 MIN: CPT | Mod: 25

## 2021-02-23 NOTE — ASSESSMENT
[FreeTextEntry1] : Type 2 DM comp CKD, retinopathy and neuropathy - poorly controlled, has been reducing Tresiba or not taking insulin due to fear of hypoglycemia\par - educated on the importance of giving Tresiba every night, increase Tresiba to 35 units, if FS at bedtime <120 can have bedtime snack but should proceed w treiba injection\par - adjusted scale to: Novolog  18 units, 151-200 22, 201-250 24, 251-300 30, 301+ 34 - to prevent hypoglycemia \par \par HTN: BP acceptable \par Continue current medications\par \par HLD\par Continue statin\par check lipid panel now\par \par Vit d def -sp vit D load, check levels, can resume OTC daiy vit D3 when load finished. \par \par \par Glucose Sensor Necessity:  This patient with diabetes (dx:  E11.65) This patient is currently using the Izabel continuous glucose monitor, scanning at least 4 times day.  The patient is treated with insulin via 3 or more injections daily.   This patient is adjusting their blood glucose based on data from the continuous glucose monitor and uses an insulin sliding scale In addition, the patient has been to our office for an evaluation of their diabetes control within the past 6 months.\par \par RTO in 1 month \par

## 2021-02-23 NOTE — HISTORY OF PRESENT ILLNESS
[FreeTextEntry1] : Interval Hx - no issues\par \par Type 2 DM\par Severity: uncontrolled \par Duration: 15+ years\par Modifying Factors: worse due to diet , worse due to nonadherence w Tresiba at bedtime\par \par SMB- 5 times a day \par Izabel DL: blood sugars have been dropping into the 60s after lunch and once in 50s early am, average glucose 197, very high 14%, high 50%, target 35%, low 1%\par \par Current drug regimen: adherent\par Tresiba 34-36 units at HS at times does not give insulin if blood sugar under 150 \par Novolog scale at Breakfast, Lunch and DInner\par Novolog  18 units, 151-200 22, 201-250 28, 251-300 32, 301+ 36\par \par Complications: \par (+) CKD follows with Dr Hidalgo\par (+) retinopathy follows Dr Villaseñor (retina) - last eye exam 2 months ago\par  (+) neuropathy on gabapentin by Kassandra.\par CAD - follows w cardio\par \par Diet - vegetarian, eats starches, occ fish, mostly vegetables\par Exercise: free weights\par \par HLD\par on Atorvastatin 10 mg daily-adherent

## 2021-02-23 NOTE — REVIEW OF SYSTEMS
[Recent Weight Gain (___ Lbs)] : recent weight gain: [unfilled] lbs [Blurred Vision] : blurred vision [Diarrhea] : diarrhea [Fatigue] : no fatigue [Decreased Appetite] : appetite not decreased [Recent Weight Loss (___ Lbs)] : no recent weight loss [Visual Field Defect] : no visual field defect [Dysphagia] : no dysphagia [Neck Pain] : no neck pain [Dysphonia] : no dysphonia [Chest Pain] : no chest pain [Palpitations] : no palpitations [Constipation] : no constipation [Polyuria] : no polyuria [Dysuria] : no dysuria [Headaches] : no headaches [Tremors] : no tremors [Depression] : no depression [Anxiety] : no anxiety [Polydipsia] : no polydipsia [Swelling] : no swelling [FreeTextEntry3] : with high blood sugars, hx of cataracts surgery  [FreeTextEntry7] : "off and on"

## 2021-02-23 NOTE — PHYSICAL EXAM
[Alert] : alert [Well Nourished] : well nourished [No Acute Distress] : no acute distress [Well Developed] : well developed [Normal Sclera/Conjunctiva] : normal sclera/conjunctiva [EOMI] : extra ocular movement intact [No LAD] : no lymphadenopathy [Thyroid Not Enlarged] : the thyroid was not enlarged [No Thyroid Nodules] : no palpable thyroid nodules [No Respiratory Distress] : no respiratory distress [No Accessory Muscle Use] : no accessory muscle use [Normal Rate and Effort] : normal respiratory rate and effort [Clear to Auscultation] : lungs were clear to auscultation bilaterally [Normal S1, S2] : normal S1 and S2 [Normal Rate] : heart rate was normal [Regular Rhythm] : with a regular rhythm [No Edema] : no peripheral edema [Pedal Pulses Normal] : the pedal pulses are present [Normal Gait] : normal gait [No Rash] : no rash [Right Foot Was Examined] : right foot ~C was examined [Left Foot Was Examined] : left foot ~C was examined [Normal] : normal [No Tremors] : no tremors [Oriented x3] : oriented to person, place, and time [Normal Affect] : the affect was normal [Normal Insight/Judgement] : insight and judgment were intact [Normal Mood] : the mood was normal [Acanthosis Nigricans] : no acanthosis nigricans [Foot Ulcers] : no foot ulcers [Diminished Throughout Both Feet] : normal tactile sensation with monofilament testing throughout both feet

## 2021-03-26 ENCOUNTER — APPOINTMENT (OUTPATIENT)
Dept: ENDOCRINOLOGY | Facility: CLINIC | Age: 76
End: 2021-03-26
Payer: MEDICARE

## 2021-03-26 VITALS
BODY MASS INDEX: 30.92 KG/M2 | HEIGHT: 70 IN | SYSTOLIC BLOOD PRESSURE: 145 MMHG | DIASTOLIC BLOOD PRESSURE: 80 MMHG | WEIGHT: 216 LBS | HEART RATE: 67 BPM | OXYGEN SATURATION: 96 %

## 2021-03-26 LAB — GLUCOSE BLDC GLUCOMTR-MCNC: 244

## 2021-03-26 PROCEDURE — 82962 GLUCOSE BLOOD TEST: CPT

## 2021-03-26 PROCEDURE — 99214 OFFICE O/P EST MOD 30 MIN: CPT | Mod: 25

## 2021-03-26 PROCEDURE — 95251 CONT GLUC MNTR ANALYSIS I&R: CPT

## 2021-03-26 NOTE — REVIEW OF SYSTEMS
[Fatigue] : fatigue [Diarrhea] : diarrhea [Tremors] : tremors [Decreased Appetite] : appetite not decreased [Recent Weight Gain (___ Lbs)] : no recent weight gain [Recent Weight Loss (___ Lbs)] : no recent weight loss [Visual Field Defect] : no visual field defect [Blurred Vision] : no blurred vision [Dysphagia] : no dysphagia [Neck Pain] : no neck pain [Dysphonia] : no dysphonia [Chest Pain] : no chest pain [Palpitations] : no palpitations [Constipation] : no constipation [Polyuria] : no polyuria [Dysuria] : no dysuria [Headaches] : no headaches [Depression] : no depression [Anxiety] : no anxiety [Polydipsia] : no polydipsia [Swelling] : no swelling [FreeTextEntry2] : weight stable  [FreeTextEntry7] : "off and on" colonoscopy done with no abnormal findings  [de-identified] : chronic hand tremors

## 2021-03-26 NOTE — PHYSICAL EXAM
[Alert] : alert [Well Nourished] : well nourished [No Acute Distress] : no acute distress [Well Developed] : well developed [EOMI] : extra ocular movement intact [No LAD] : no lymphadenopathy [Thyroid Not Enlarged] : the thyroid was not enlarged [No Thyroid Nodules] : no palpable thyroid nodules [No Respiratory Distress] : no respiratory distress [No Accessory Muscle Use] : no accessory muscle use [Normal Rate and Effort] : normal respiratory rate and effort [Clear to Auscultation] : lungs were clear to auscultation bilaterally [Normal S1, S2] : normal S1 and S2 [Normal Rate] : heart rate was normal [Regular Rhythm] : with a regular rhythm [No Edema] : no peripheral edema [Pedal Pulses Normal] : the pedal pulses are present [Normal Gait] : normal gait [No Rash] : no rash [Acanthosis Nigricans] : no acanthosis nigricans [Foot Ulcers] : no foot ulcers [Right Foot Was Examined] : right foot ~C was examined [Left Foot Was Examined] : left foot ~C was examined [Normal] : normal [Diminished Throughout Both Feet] : normal tactile sensation with monofilament testing throughout both feet [No Tremors] : no tremors [Oriented x3] : oriented to person, place, and time [Normal Affect] : the affect was normal [Normal Insight/Judgement] : insight and judgment were intact [Normal Mood] : the mood was normal [de-identified] : red conjunctiva

## 2021-03-26 NOTE — HISTORY OF PRESENT ILLNESS
[FreeTextEntry1] : Interval Hx - no issues\par \par Type 2 DM\par Severity: uncontrolled \par Duration: 15+ years\par Modifying Factors: worse due to diet , worse due to nonadherence w Tresiba at bedtime\par \par SMB- 5 times a day \par Izabel DL: had 2 lows after 10 pm due to over correction, average glucose 221, very high 27%, high 46%, target 27%, low 0%\par Current  - had Indonesian and cup of tea this morning \par \par Current drug regimen: adherent\par Tresiba 35 units at HS at times does not give insulin if blood sugar under 150 \par Novolog scale at Breakfast, Lunch and DInner\par Novolog  18 units, 151-200 22 units, 201-250 28 units, 251-300 32 units, 301+ 36 units\par \par Complications: \par (+) CKD follows with Dr Hidalgo\par (+) retinopathy follows Dr Villaseñor (retina) - last eye exam 2 months ago\par  (+) neuropathy on gabapentin by Kassandra.\par CAD - follows w cardio\par \par Diet - vegetarian, eats starches, occ fish, mostly vegetables\par Exercise: free weights\par \par HLD\par on Atorvastatin 10 mg daily-adherent

## 2021-03-26 NOTE — ASSESSMENT
[FreeTextEntry1] : Type 2 DM comp CKD, retinopathy and neuropathy - poorly controlled, has been reducing Tresiba or not taking insulin due to fear of hypoglycemia\par - needs updated labs\par - educated on the importance of giving Tresiba every night, increase Tresiba to 38 units, if FS at bedtime <120 can have bedtime snack but should proceed w treiba injection\par - increase: Novolog  20 units, 151-200 24 units, 201-250 26 units, 251-300 32 units, 301+ 34 units \par - bring in Izabel reader in 1 week for DL \par \par HTN: BP acceptable \par Continue current medications\par \par HLD\par Continue statin\par check lipid panel now\par \par Vit d def -sp vit D load, check levels, can resume OTC daiy vit D3 when load finished. \par \par \par Glucose Sensor Necessity:  This patient with diabetes (dx:  E11.65) This patient is currently using the Izabel continuous glucose monitor, scanning at least 4 times day.  The patient is treated with insulin via 3 or more injections daily.   This patient is adjusting their blood glucose based on data from the continuous glucose monitor and uses an insulin sliding scale In addition, the patient has been to our office for an evaluation of their diabetes control within the past 6 months.\par \par RTO in 6 weeks with Dr. Ashford\par

## 2021-04-06 ENCOUNTER — NON-APPOINTMENT (OUTPATIENT)
Age: 76
End: 2021-04-06

## 2021-04-23 ENCOUNTER — RX RENEWAL (OUTPATIENT)
Age: 76
End: 2021-04-23

## 2021-05-04 ENCOUNTER — APPOINTMENT (OUTPATIENT)
Dept: ENDOCRINOLOGY | Facility: CLINIC | Age: 76
End: 2021-05-04
Payer: MEDICARE

## 2021-05-04 VITALS
HEIGHT: 70 IN | WEIGHT: 215 LBS | OXYGEN SATURATION: 99 % | DIASTOLIC BLOOD PRESSURE: 80 MMHG | SYSTOLIC BLOOD PRESSURE: 130 MMHG | HEART RATE: 72 BPM | BODY MASS INDEX: 30.78 KG/M2

## 2021-05-04 LAB — GLUCOSE BLDC GLUCOMTR-MCNC: 223

## 2021-05-04 PROCEDURE — 95251 CONT GLUC MNTR ANALYSIS I&R: CPT

## 2021-05-04 PROCEDURE — 82962 GLUCOSE BLOOD TEST: CPT

## 2021-05-04 PROCEDURE — 99214 OFFICE O/P EST MOD 30 MIN: CPT | Mod: 25

## 2021-05-04 NOTE — DATA REVIEWED
[FreeTextEntry1] : Labs: \par 10/16/20\par A1C 10.2%\par Creatinine 1.48\par GFR 47\par Cholesterol 196\par Triglycerides 185\par Vitamin D 26.9\par \par labs 1/11/21 GFR 54.

## 2021-05-04 NOTE — REVIEW OF SYSTEMS
[Fatigue] : fatigue [Diarrhea] : diarrhea [Tremors] : tremors [Nocturia] : nocturia [Decreased Appetite] : appetite not decreased [Visual Field Defect] : no visual field defect [Blurred Vision] : no blurred vision [Dysphagia] : no dysphagia [Neck Pain] : no neck pain [Dysphonia] : no dysphonia [Chest Pain] : no chest pain [Palpitations] : no palpitations [Constipation] : no constipation [Polyuria] : no polyuria [Dysuria] : no dysuria [Headaches] : no headaches [Pain/Numbness of Digits] : no pain/numbness of digits [Depression] : no depression [Anxiety] : no anxiety [Polydipsia] : no polydipsia [Swelling] : no swelling [FreeTextEntry7] : "off and on" colonoscopy done with no abnormal findings  [FreeTextEntry2] : weight stable  [de-identified] : chronic hand tremors

## 2021-05-04 NOTE — PHYSICAL EXAM
[Alert] : alert [Well Nourished] : well nourished [No Acute Distress] : no acute distress [Well Developed] : well developed [EOMI] : extra ocular movement intact [No LAD] : no lymphadenopathy [Thyroid Not Enlarged] : the thyroid was not enlarged [No Thyroid Nodules] : no palpable thyroid nodules [No Respiratory Distress] : no respiratory distress [No Accessory Muscle Use] : no accessory muscle use [Normal Rate and Effort] : normal respiratory rate and effort [Clear to Auscultation] : lungs were clear to auscultation bilaterally [Normal S1, S2] : normal S1 and S2 [Normal Rate] : heart rate was normal [Regular Rhythm] : with a regular rhythm [No Edema] : no peripheral edema [Pedal Pulses Normal] : the pedal pulses are present [Normal Gait] : normal gait [No Tremors] : no tremors [Oriented x3] : oriented to person, place, and time [Normal Affect] : the affect was normal [Normal Insight/Judgement] : insight and judgment were intact [Normal Mood] : the mood was normal [de-identified] : red conjunctiva

## 2021-05-04 NOTE — HISTORY OF PRESENT ILLNESS
[FreeTextEntry1] : Interval Hx - no issues, no changes\par \par Type 2 DM\par Severity: uncontrolled \par Duration: 15+ years\par Modifying Factors: worse due to diet , worse due to nonadherence w Tresiba at bedtime\par \par SMBG: Izabel CGM\par Izabel DL:  \par CGM downloaded and reviewed: Izabel\par Average glucose: 231\par % time CGM active: 89%\par Glucose variability (target <36%): 27\par \par % VERY HIGH (>250): 35\par % HIGH (181-250): 41\par % TARGET (): 24\par % LOW (54-69):0\par % VERY LOW (<54): 0\par \par Interpretation: persistant hyperglycemia\par \par Current drug regimen: adherent. takes extra insulin when Izabel alerts at 200 (during night and morning). reports low sugar 1 evening ?due to excessive Novolog bolus for high sugar\par Tresiba 36 units at HS;  at times does not give insulin if blood sugar under 150 \par Novolog scale at Breakfast, Lunch and DInner\par Novolog  15 units, 151-200 22 units, 201-250 28 units, 251-300 32 units, 301+ 36 units\par \par Complications: \par (+) CKD follows with Dr Hidalgo\par (+) retinopathy follows Dr Villaseñor (retina) \par  (+) neuropathy on gabapentin by Kassandra.\par CAD - follows w cardio\par ===================================\par \par HLD\par on Atorvastatin 10 mg daily-adherent

## 2021-05-04 NOTE — ASSESSMENT
[FreeTextEntry1] : Type 2 DM comp CKD, retinopathy and neuropathy - poorly controlled, peristant hyperglycemia on Izabel DL.  Takes Tresiba but hold this is , has also been taking frquent novolog bolus for hyperglycemia and had a low sugar as a result\par - avoid frequent Novolog bolus, if Izabel alerts then he should hydrate and walk around,  advised pt that if he is having frequent hyperglycemia alerts then he likely needs more insulin and/or witholding tresiba is contributing to his hyperglycemia\par - incresae Tresiba 40 units at bedtime\par - increase Novolog  24 units, 151-200 26 units, 201-250 28 units, 251-300 30 units, 301+ 32 units \par - needs updated labs\par - educated on the importance of giving Tresiba every night, discussed that if FS <80 he should have a snack and when FS aove 100 ok to take insulin with meal as planned\par - RTO 1 month w NP\par \par HTN: BP acceptable \par Continue amlodipine, hydralazine\par \par HLD\par Continue statin\par check lipid panel now\par \par Vit d def -sp vit D load, check levels\par \par \par Glucose Sensor Necessity:  This patient with diabetes (dx:  E11.65) This patient is currently using the Izabel continuous glucose monitor, scanning at least 4 times day.  The patient is treated with insulin via 3 or more injections daily.   This patient is adjusting their blood glucose based on data from the continuous glucose monitor and uses an insulin sliding scale In addition, the patient has been to our office for an evaluation of their diabetes control within the past 6 months.\par \par

## 2021-05-14 ENCOUNTER — NON-APPOINTMENT (OUTPATIENT)
Age: 76
End: 2021-05-14

## 2021-09-09 ENCOUNTER — NON-APPOINTMENT (OUTPATIENT)
Age: 76
End: 2021-09-09

## 2021-09-17 ENCOUNTER — APPOINTMENT (OUTPATIENT)
Dept: ENDOCRINOLOGY | Facility: CLINIC | Age: 76
End: 2021-09-17

## 2021-12-01 ENCOUNTER — APPOINTMENT (OUTPATIENT)
Dept: ENDOCRINOLOGY | Facility: CLINIC | Age: 76
End: 2021-12-01

## 2021-12-23 LAB
HBA1C MFR BLD HPLC: 11.5
LDLC SERPL CALC-MCNC: 83

## 2021-12-28 ENCOUNTER — APPOINTMENT (OUTPATIENT)
Dept: ENDOCRINOLOGY | Facility: CLINIC | Age: 76
End: 2021-12-28
Payer: MEDICARE

## 2021-12-28 PROCEDURE — 99443: CPT | Mod: 95

## 2021-12-28 RX ORDER — PANCRELIPASE LIPASE, PANCRELIPASE PROTEASE, PANCRELIPASE AMYLASE 20000; 63000; 84000 [USP'U]/1; [USP'U]/1; [USP'U]/1
20000-63000 CAPSULE, DELAYED RELEASE ORAL
Qty: 90 | Refills: 0 | Status: ACTIVE | COMMUNITY
Start: 2021-12-26

## 2021-12-28 RX ORDER — ERGOCALCIFEROL 1.25 MG/1
1.25 MG CAPSULE, LIQUID FILLED ORAL
Qty: 12 | Refills: 1 | Status: DISCONTINUED | COMMUNITY
Start: 2020-03-12 | End: 2021-12-28

## 2021-12-28 RX ORDER — PRIMIDONE 50 MG/1
50 TABLET ORAL
Qty: 30 | Refills: 0 | Status: ACTIVE | COMMUNITY
Start: 2021-08-10

## 2021-12-28 RX ORDER — BIMATOPROST 0.1 MG/ML
0.01 SOLUTION/ DROPS OPHTHALMIC
Qty: 2 | Refills: 0 | Status: ACTIVE | COMMUNITY
Start: 2021-05-03

## 2021-12-28 NOTE — ASSESSMENT
[FreeTextEntry1] : Type 2 DM comp CKD, retinopathy and neuropathy - poorly controlled, fearful of lows and afraid to take Tresiba at night\par - rx for Izabel sensor sent, pt will stop by office for Izabel DL and  sample sensor\par - take Tresiba 26 units in morning\par - cont Novolog  24 units, 151-200 26 units, 201-250 28 units, 251-300 30 units, 301+ 32 units \par - educated on the importance of adherence w Tresiba\par - RTO 1 month NP ,RTO CDE visit\par \par HTN: \par - Continue amlodipine, hydralazine\par \par HLD\par - Continue statin\par \par \par \par Glucose Sensor Necessity:  This patient with diabetes (dx:  E11.65) This patient is currently using the Izabel continuous glucose monitor, scanning at least 4 times day.  The patient is treated with insulin via 3 or more injections daily.   This patient is adjusting their blood glucose based on data from the continuous glucose monitor and uses an insulin sliding scale In addition, the patient has been to our office for an evaluation of their diabetes control within the past 6 months.\par \par

## 2021-12-28 NOTE — REASON FOR VISIT
[Follow - Up] : a follow-up visit [DM Type 2] : DM Type 2 [Home] : at home, [unfilled] , at the time of the visit. [Medical Office: ([facility])___] : at the medical office located in  [Verbal consent obtained from patient] : the patient, [unfilled]

## 2021-12-28 NOTE — DATA REVIEWED
[FreeTextEntry1] : Labs: \par 10/16/20\par A1C 10.2%\par Creatinine 1.48\par GFR 47\par Cholesterol 196\par Triglycerides 185\par Vitamin D 26.9\par \par labs 1/11/21 GFR 54. \par \par Labs 10/25/21\par Gluc 300\par Cr 1.46, GFR 47\par LDL 83, HDL 38, Tg 142\par A1c 11.5

## 2021-12-28 NOTE — REVIEW OF SYSTEMS
[Nocturia] : nocturia [Visual Field Defect] : no visual field defect [Blurred Vision] : blurred vision [Dysphagia] : no dysphagia [Neck Pain] : no neck pain [Dysphonia] : no dysphonia [Chest Pain] : no chest pain [Nausea] : no nausea [Abdominal Pain] : no abdominal pain [Polyuria] : no polyuria [Dysuria] : no dysuria [Pain/Numbness of Digits] : no pain/numbness of digits [Depression] : no depression [Anxiety] : no anxiety [Polydipsia] : no polydipsia [Swelling] : no swelling

## 2021-12-28 NOTE — HISTORY OF PRESENT ILLNESS
[FreeTextEntry1] : Interval Hx - out of sensors 1 week ago, reports ups and down w frequent lows\par \par Type 2 DM\par Severity: uncontrolled \par Duration: 15+ years\par Modifying Factors: worse due to diet, does not eat meat, eats high carb diet, better w exercise\par \par SMBG: Izabel CGM\par \par Current drug regimen: nonadherent. \par Tresiba 36 units at HS; does not take this if FS <150\par Novolog scale at Breakfast, Lunch and DInner - adherent\par Novolog  24 units, 151-200 26 units, 201-250 28 units, 251-300 30 units, 301+ 32 units\par \par Complications: \par (+) CKD follows with Dr Hidalgo\par (+) retinopathy follows Dr Villaseñor (retina) \par  (+) neuropathy on gabapentin by Kassandra.\par CAD - follows w cardio\par ===================================\par HLD on Atorvastatin 10 mg daily-adherent

## 2021-12-30 ENCOUNTER — NON-APPOINTMENT (OUTPATIENT)
Age: 76
End: 2021-12-30

## 2022-01-26 ENCOUNTER — APPOINTMENT (OUTPATIENT)
Dept: ENDOCRINOLOGY | Facility: CLINIC | Age: 77
End: 2022-01-26
Payer: MEDICARE

## 2022-01-26 PROCEDURE — G0108 DIAB MANAGE TRN  PER INDIV: CPT

## 2022-01-26 RX ORDER — BLOOD SUGAR DIAGNOSTIC
STRIP MISCELLANEOUS
Qty: 1 | Refills: 5 | Status: ACTIVE | COMMUNITY
Start: 2022-01-26 | End: 1900-01-01

## 2022-01-26 RX ORDER — GLUCAGON 1 MG
1 KIT INJECTION
Qty: 1 | Refills: 5 | Status: ACTIVE | COMMUNITY
Start: 2022-01-26 | End: 1900-01-01

## 2022-02-03 ENCOUNTER — APPOINTMENT (OUTPATIENT)
Dept: ENDOCRINOLOGY | Facility: CLINIC | Age: 77
End: 2022-02-03
Payer: MEDICARE

## 2022-02-03 VITALS
HEIGHT: 70 IN | BODY MASS INDEX: 30.06 KG/M2 | SYSTOLIC BLOOD PRESSURE: 122 MMHG | WEIGHT: 210 LBS | HEART RATE: 79 BPM | DIASTOLIC BLOOD PRESSURE: 76 MMHG | OXYGEN SATURATION: 97 %

## 2022-02-03 LAB — GLUCOSE BLDC GLUCOMTR-MCNC: 244

## 2022-02-03 PROCEDURE — 99214 OFFICE O/P EST MOD 30 MIN: CPT | Mod: 25

## 2022-02-03 PROCEDURE — 82962 GLUCOSE BLOOD TEST: CPT

## 2022-02-03 PROCEDURE — 95251 CONT GLUC MNTR ANALYSIS I&R: CPT

## 2022-02-03 NOTE — REVIEW OF SYSTEMS
[Constipation] : constipation [Recent Weight Gain (___ Lbs)] : no recent weight gain [Recent Weight Loss (___ Lbs)] : no recent weight loss [Dysphagia] : no dysphagia [Neck Pain] : no neck pain [Dysphonia] : no dysphonia [Chest Pain] : no chest pain [Shortness Of Breath] : no shortness of breath [Polyuria] : no polyuria [Polydipsia] : no polydipsia [FreeTextEntry7] : s/p colonscopy , 3 days ago

## 2022-02-03 NOTE — ASSESSMENT
[FreeTextEntry1] : T2DM\par -According to maday and pts weight lifting goals, pt is having consistent lows around 530/6 pm due to his vigrious weight lifting routine. Pt will have a protein/carb snack immediately before exercise. No changes to insulin regimen at this time.\par -Would benefit from a CDE apt as he is a vegetarian and has limited options and leads a carb heavy diet\par -Increase exercise as tolerated, goal of 30 mins a day 5x a week\par -Continue follow up with ophtho, podiatry, and renal\par \par HTN\par Continue regimen \par BP stable in office \par \par HLD\par Continue statin, fasting labs to be done\par \par Fasting labs needed ASAP\par Pt to come to office tomorrow for maday download in 2 weeks\par RTO in 2 months

## 2022-02-03 NOTE — HISTORY OF PRESENT ILLNESS
[FreeTextEntry1] : T2DM\par Severity: uncontrolled \par Duration: about 15 years\par Modifying Factors: on insulin for approx 5 years \par \par SMBG\par Izabel\par Average glucose 181\par GMI 7.6%\par Glucose variability 34.1%\par \par Very low 0%\par Low 0%\par In target 51%\par High 36%\par Very high 12%\par \par **PT consistently has a dramatic drop in blood sugars from 200-300s to 50s-90s at appeox 5:30/6 pm daily. This is due to his vigirous weight lifting exercise routine. He is currently trying to break the record for heaviest weight lifted as a 76 year old**\par \par FS in office 244- had vegetable soup 3 hours ago\par \par Need updated HGA1C\par \par Current drug regimen\par Tresiba 24 units at HS\par Novolog scale at Breakfast, Lunch and DInner - adherent\par Novolog  24 units, 151-200 26 units, 201-250 28 units, 251-300 30 units, 301+ 32 units\par \par \par Eye exam: next visit 2/10/202, +retinopathy, +cataract surgery \par Foot exam: last visit 2019- endorses nueorpahty, takes gabapentin \par Kidney disease: +CKD, following with Dr. Hidalgo \par Heart disease: sees cardiology \par following with Dr Hidalgo (renal) and Dr Anu Villaseñor (retina) and podiatry (Dr Cannon)\par \par Weight: relatively stable\par Diet: wife cooks\par pt is vegetarian so his diet is starch heavy\par " if its on the plate, im going to eat it"\par Exercise: moderate weight lifting , boxing \par Smoking: denies \par \par HTN\par BP in office 122/76\par Amlodipine 10 mg daily\par Hydralazine 100 mg TID\par \par HLD\par Need updated lipid panel \par Atorvastatin 10 mg daily

## 2022-02-16 ENCOUNTER — NON-APPOINTMENT (OUTPATIENT)
Age: 77
End: 2022-02-16

## 2022-06-14 LAB
HBA1C MFR BLD HPLC: 9
LDLC SERPL CALC-MCNC: 78
MICROALBUMIN/CREAT 24H UR-RTO: 16

## 2022-06-15 ENCOUNTER — APPOINTMENT (OUTPATIENT)
Dept: ENDOCRINOLOGY | Facility: CLINIC | Age: 77
End: 2022-06-15
Payer: MEDICARE

## 2022-06-15 VITALS
BODY MASS INDEX: 30.21 KG/M2 | HEART RATE: 74 BPM | SYSTOLIC BLOOD PRESSURE: 124 MMHG | DIASTOLIC BLOOD PRESSURE: 74 MMHG | WEIGHT: 211 LBS | OXYGEN SATURATION: 97 % | HEIGHT: 70 IN

## 2022-06-15 DIAGNOSIS — G47.30 SLEEP APNEA, UNSPECIFIED: ICD-10-CM

## 2022-06-15 LAB — GLUCOSE BLDC GLUCOMTR-MCNC: 197

## 2022-06-15 PROCEDURE — 99214 OFFICE O/P EST MOD 30 MIN: CPT | Mod: 25

## 2022-06-15 PROCEDURE — 82962 GLUCOSE BLOOD TEST: CPT

## 2022-06-15 NOTE — ASSESSMENT
[FreeTextEntry1] : Type 2 DM comp CKD, retinopathy and neuropathy - poor control, erratic glucoses by history\par - cont current insulin dosing\par - RTO izabel reader for izabel DL\par - updated labs needed\par \par HTN: BP ok\par - Continue amlodipine, hydralazine\par \par HLD\par - Continue statin\par \par \par \par Glucose Sensor Necessity:  This patient with diabetes (dx:  E11.65) This patient is currently using the Izabel continuous glucose monitor, scanning at least 4 times day.  The patient is treated with insulin via 3 or more injections daily.   This patient is adjusting their blood glucose based on data from the continuous glucose monitor and uses an insulin sliding scale In addition, the patient has been to our office for an evaluation of their diabetes control within the past 6 months.\par \par

## 2022-06-15 NOTE — HISTORY OF PRESENT ILLNESS
[FreeTextEntry1] : Interval Hx - no issues, Dx sleep apnea, weight loss, increased urination with high blood sugar\par \par Type 2 DM\par Severity: uncontrolled \par Duration: 15+ years\par Modifying Factors: worse with diet, vegetarian so lots carbs/vegetable and eats fish\par \par SMBG: Izabel CGM, did not bring reader, per pt numbers up/down including a few lows - FS 50-70's\par \par Current drug regimen: \par Tresiba 40-44units in the morning, misses on occasion\par Novolog scale before Breakfast, Lunch and DInner - adherent\par Novolog  24 units, 151-200 26 units, 201-250 28 units, 251-300 30 units, 301+ 32 units\par \par Complications: \par (+) CKD follows with Renal\par (+) retinopathy follows Dr Villaseñor (retina) \par (+) neuropathy on gabapentin by Kassandra.\par CAD - follows w cardio\par ===================================\par HLD on Atorvastatin 10 mg daily-adherent

## 2022-06-15 NOTE — PHYSICAL EXAM
[Alert] : alert [No Acute Distress] : no acute distress [No Accessory Muscle Use] : no accessory muscle use [Clear to Auscultation] : lungs were clear to auscultation bilaterally [Normal S1, S2] : normal S1 and S2 [Normal Rate] : heart rate was normal [No Edema] : no peripheral edema [Not Tender] : non-tender [Soft] : abdomen soft [Oriented x3] : oriented to person, place, and time [Normal Affect] : the affect was normal [Normal Insight/Judgement] : insight and judgment were intact [Normal Mood] : the mood was normal

## 2022-06-15 NOTE — REVIEW OF SYSTEMS
[Fatigue] : fatigue [Recent Weight Loss (___ Lbs)] : recent weight loss: [unfilled] lbs [Blurred Vision] : blurred vision [As Noted in HPI] : as noted in HPI [Chest Pain] : no chest pain [Shortness Of Breath] : no shortness of breath [SOB on Exertion] : no shortness of breath on exertion [Nausea] : no nausea [Abdominal Pain] : no abdominal pain [Pain/Numbness of Digits] : no pain/numbness of digits

## 2022-06-17 ENCOUNTER — NON-APPOINTMENT (OUTPATIENT)
Age: 77
End: 2022-06-17

## 2022-06-23 ENCOUNTER — NON-APPOINTMENT (OUTPATIENT)
Age: 77
End: 2022-06-23

## 2022-07-12 ENCOUNTER — RX RENEWAL (OUTPATIENT)
Age: 77
End: 2022-07-12

## 2022-10-07 LAB
HBA1C MFR BLD HPLC: 10.9
LDLC SERPL DIRECT ASSAY-MCNC: 98
MICROALBUMIN/CREAT 24H UR-RTO: 5

## 2022-10-08 ENCOUNTER — APPOINTMENT (OUTPATIENT)
Dept: ENDOCRINOLOGY | Facility: CLINIC | Age: 77
End: 2022-10-08

## 2022-10-08 VITALS
OXYGEN SATURATION: 97 % | DIASTOLIC BLOOD PRESSURE: 70 MMHG | BODY MASS INDEX: 30.78 KG/M2 | WEIGHT: 215 LBS | SYSTOLIC BLOOD PRESSURE: 120 MMHG | HEART RATE: 95 BPM | HEIGHT: 70 IN

## 2022-10-08 DIAGNOSIS — E55.9 VITAMIN D DEFICIENCY, UNSPECIFIED: ICD-10-CM

## 2022-10-08 LAB — GLUCOSE BLDC GLUCOMTR-MCNC: 246

## 2022-10-08 PROCEDURE — 82962 GLUCOSE BLOOD TEST: CPT

## 2022-10-08 PROCEDURE — 99215 OFFICE O/P EST HI 40 MIN: CPT | Mod: 25

## 2022-10-08 PROCEDURE — 95251 CONT GLUC MNTR ANALYSIS I&R: CPT

## 2022-10-08 RX ORDER — GABAPENTIN 300 MG/1
300 CAPSULE ORAL
Refills: 0 | Status: DISCONTINUED | COMMUNITY
End: 2022-10-08

## 2022-10-08 RX ORDER — METOCLOPRAMIDE 5 MG/1
5 TABLET ORAL
Refills: 0 | Status: ACTIVE | COMMUNITY

## 2022-10-08 RX ORDER — FLASH GLUCOSE SCANNING READER
EACH MISCELLANEOUS
Qty: 1 | Refills: 0 | Status: DISCONTINUED | COMMUNITY
Start: 1900-01-01 | End: 2022-10-08

## 2022-10-08 RX ORDER — ERGOCALCIFEROL 1.25 MG/1
1.25 MG CAPSULE ORAL
Qty: 12 | Refills: 0 | Status: DISCONTINUED | COMMUNITY
Start: 2022-02-16 | End: 2022-10-08

## 2022-10-08 RX ORDER — DICYCLOMINE HYDROCHLORIDE 10 MG/1
10 CAPSULE ORAL
Refills: 0 | Status: ACTIVE | COMMUNITY

## 2022-10-08 RX ORDER — CLONIDINE HYDROCHLORIDE 0.2 MG/1
0.2 TABLET ORAL
Refills: 0 | Status: ACTIVE | COMMUNITY

## 2022-10-08 RX ORDER — BLOOD SUGAR DIAGNOSTIC
STRIP MISCELLANEOUS
Qty: 4 | Refills: 3 | Status: ACTIVE | COMMUNITY
Start: 2022-10-08 | End: 1900-01-01

## 2022-10-08 RX ORDER — DIPHENOXYLATE HYDROCHLORIDE AND ATROPINE SULFATE 2.5; .025 MG/1; MG/1
2.5-0.025 TABLET ORAL
Refills: 0 | Status: ACTIVE | COMMUNITY

## 2022-10-08 NOTE — ASSESSMENT
[FreeTextEntry1] : Type 2 DM comp CKD, retinopathy and neuropathy - poor control on review or Izabel, now w gastroparesis and chronic diarrhea\par - explained how hyperglycemia can affect bowel motility and advised better glycemic control\par - needs to adhere with Tresiba, as missing dosed will contribute to hyperglycemia, increase to 62 units daily\par - increase Novolog scale  24, 151-200 26, 201-250 28, 251-300 30, 301-350 32, 351-400 34, 401+ 46\par - will get recent labs from [facility]\par - RTO 4-6 weeks w NP to review Izabel DL and adjust insulin \par - waiting for Libre2 sensor shipment, until then test FS w glucometer, rx for test strips sent\par \par HTN: BP ok\par - Continue amlodipine, hydralazine\par \par HLD\par - Continue statin\par \par \par Glucose Sensor Necessity:  This patient with diabetes (dx:  E11.65) This patient is currently using the Izabel continuous glucose monitor, scanning at least 4 times day.  The patient is treated with insulin via 3 or more injections daily.   This patient is adjusting their blood glucose based on data from the continuous glucose monitor and uses an insulin sliding scale In addition, the patient has been to our office for an evaluation of their diabetes control within the past 6 months.\par \par 40 min spent counseling on review of Izabel CGM, hyperglycemia risks/long term effet, insulin adherence and review of insulin dosing changes, written instruction provided\par

## 2022-10-08 NOTE — HISTORY OF PRESENT ILLNESS
[FreeTextEntry1] : Interval Hx - chronic diarrhea and seeing GI and put on multiple meds, had gastric emptying sudy that showed retained food, \par ran out os izabel sensor waiting for supply, needs rx for test strips\par \par Type 2 DM\par Severity: uncontrolled \par Duration: 15+ years\par Modifying Factors: worse with diet, vegetarian so lots carbs/vegetable and eats fish, 2 meals 1230 pm and 8 pm\par \par SMBG: Izabel 2 CGM\par CGM downloaded and reviewed: Izabel 2\par Average glucose: 244\par % time CGM active: 86%\par Glucose variability (target <36%): 37\par \par % VERY HIGH (>250): 46\par % HIGH (181-250): 27\par % TARGET (): 26\par % LOW (54-69): 1\par % VERY LOW (<54): 0\par \par Interpretation: worsening control, peristan hyperglycemia, rapid decline in sugars at times\par \par Current drug regimen: \par Tresiba 56 units in the morning, misses 2-3x week\par Novolog scale before Breakfast, Lunch and DInner - adherent\par Novolog  20 units, 151-200 22 units, 201-250 24 units, 251-300 26 units, 301+ 30 units\par \par Complications: \par (+) CKD follows with Renal\par (+) retinopathy follows Dr Villaseñor (retina) , Dr Card\par (+) neuropathy off gabapentin, (+) gastroparesis\par CAD - follows w cardio\par ===================================\par HLD on Atorvastatin 10 mg daily-adherent

## 2022-10-08 NOTE — PHYSICAL EXAM
[Alert] : alert [No Acute Distress] : no acute distress [No Accessory Muscle Use] : no accessory muscle use [Clear to Auscultation] : lungs were clear to auscultation bilaterally [Normal S1, S2] : normal S1 and S2 [Normal Rate] : heart rate was normal [No Edema] : no peripheral edema [Not Tender] : non-tender [Oriented x3] : oriented to person, place, and time [Soft] : abdomen soft [Normal Affect] : the affect was normal [Normal Insight/Judgement] : insight and judgment were intact [Normal Mood] : the mood was normal

## 2022-10-08 NOTE — DATA REVIEWED
[FreeTextEntry1] : Labs: \par 10/16/20\par A1C 10.2%\par Creatinine 1.48\par GFR 47\par Cholesterol 196\par Triglycerides 185\par Vitamin D 26.9\par \par labs 1/11/21 GFR 54. \par \par Labs 10/25/21\par Gluc 300\par Cr 1.46, GFR 47\par LDL 83, HDL 38, Tg 142\par A1c 11.5\par \par "Labs 6/16/22\par Cr 1.86 - f/u renal\par LDL chol 98, Tg 172 - elevated, watch diet, increase atorvastatin to 20 mg nightly\par normal thyroid\par urine alb negative\par A1c 10.9 - too high"

## 2022-10-11 ENCOUNTER — NON-APPOINTMENT (OUTPATIENT)
Age: 77
End: 2022-10-11

## 2022-10-20 ENCOUNTER — NON-APPOINTMENT (OUTPATIENT)
Age: 77
End: 2022-10-20

## 2022-11-09 NOTE — DATA REVIEWED
Mitzi called to speak to Cely in regards to a phone call she received from Cely.    [FreeTextEntry1] : Labs: \par 10/16/20\par A1C 10.2%\par Creatinine 1.48\par GFR 47\par Cholesterol 196\par Triglycerides 185\par Vitamin D 26.9\par \par labs 1/11/21 GFR 54

## 2022-11-22 ENCOUNTER — RX RENEWAL (OUTPATIENT)
Age: 77
End: 2022-11-22

## 2022-12-01 LAB
HBA1C MFR BLD HPLC: 10
LDLC SERPL DIRECT ASSAY-MCNC: 118

## 2022-12-02 ENCOUNTER — APPOINTMENT (OUTPATIENT)
Dept: ENDOCRINOLOGY | Facility: CLINIC | Age: 77
End: 2022-12-02

## 2022-12-02 PROCEDURE — 99443: CPT | Mod: 95

## 2022-12-02 RX ORDER — CHLORTHALIDONE 25 MG/1
25 TABLET ORAL
Qty: 90 | Refills: 0 | Status: ACTIVE | COMMUNITY
Start: 2022-09-12

## 2022-12-02 RX ORDER — PREDNISOLONE ACETATE 10 MG/ML
1 SUSPENSION/ DROPS OPHTHALMIC
Qty: 5 | Refills: 0 | Status: ACTIVE | COMMUNITY
Start: 2022-09-19

## 2022-12-02 RX ORDER — LOSARTAN POTASSIUM 100 MG/1
100 TABLET, FILM COATED ORAL
Qty: 90 | Refills: 0 | Status: ACTIVE | COMMUNITY
Start: 2022-10-20

## 2022-12-02 RX ORDER — AMLODIPINE BESYLATE 5 MG/1
5 TABLET ORAL
Qty: 90 | Refills: 0 | Status: DISCONTINUED | COMMUNITY
Start: 2022-10-20

## 2022-12-02 RX ORDER — LOSARTAN POTASSIUM 25 MG/1
25 TABLET, FILM COATED ORAL
Qty: 90 | Refills: 0 | Status: DISCONTINUED | COMMUNITY
Start: 2022-10-26

## 2022-12-02 NOTE — HISTORY OF PRESENT ILLNESS
[Home] : at home, [unfilled] , at the time of the visit. [Other Location: e.g. Home (Enter Location, City,State)___] : at [unfilled] [Verbal consent obtained from patient] : the patient, [unfilled] [FreeTextEntry1] : Interval Hx - no issues, Dx sleep apnea. Patient has IBS and physician wants hold off on sleep study until bowels are regulated. \par \par Type 2 DM\par Severity: uncontrolled \par Duration: 15+ years\par Modifying Factors: worse with diet, vegetarian so lots carbs/vegetable and eats fish\par \par SMBG: Izabel CGM, did not bring reader, per pt numbers have been more low than normal, mostly at night\par BG 54 at night at times\par Izabel reviewed - blood sugars dropping low after insulin is given for high blood sugars over 260, needs to give insulin before meals\par blood sugar mostly high throughout the day except for a couple of lows\par Current  scanned with Izabel - fasting, took Tresiba 10 minutes ago \par \par Current drug regimen: \par Tresiba 55 units in the morning, misses on occasion\par Novolog scale before Breakfast, Lunch and DInner - adherent\par Novolog  24 units, 151-200 26 units, 201-250 28 units, 251-300 30 units, 301+ 32 units\par \par Complications: \par (+) CKD follows with Renal\par (+) retinopathy follows Dr Villaseñor (retina) \par (+) neuropathy on gabapentin by Kassandra.\par CAD - follows w cardio\par ===================================\par HLD on Atorvastatin 40 mg daily-adherent [Continuous Glucose Monitoring] : Continuous Glucose Monitoring: Yes [Izabel] : Izabel [FreeTextEntry2] : 33 [FreeTextEntry3] : 66 [FreeTextEntry4] : 1 [de-identified] : 8.7 [FreeTextEntry5] : 225 [FreeTextEntry6] : 37.3

## 2022-12-02 NOTE — REVIEW OF SYSTEMS
[Fatigue] : no fatigue [Decreased Appetite] : appetite not decreased [Recent Weight Gain (___ Lbs)] : no recent weight gain [Recent Weight Loss (___ Lbs)] : no recent weight loss [Visual Field Defect] : no visual field defect [Blurred Vision] : no blurred vision [Dysphagia] : no dysphagia [Neck Pain] : no neck pain [Dysphonia] : no dysphonia [Chest Pain] : no chest pain [Palpitations] : no palpitations [Polyuria] : no polyuria [Dysuria] : no dysuria [Headaches] : no headaches [Tremors] : no tremors [Depression] : no depression [Anxiety] : no anxiety [Polydipsia] : no polydipsia [Swelling] : no swelling [FreeTextEntry2] : 216 current weight, stable [FreeTextEntry3] : no overall changes [FreeTextEntry7] : laura IBS

## 2022-12-02 NOTE — ASSESSMENT
[FreeTextEntry1] : Type 2 DM comp CKD, retinopathy and neuropathy - poor control, erratic blood sugar\par - increase Tresiba to 60 units in morning\par - adjusted Novolog scale if -300 give 28 units and over 301 give 30 units, continue scale above 251, scale adjusted to prevent low blood sugars \par - call office is low blood sugars continue \par - check A1c before next visit \par \par HTN:\par - Continue amlodipine, hydralazine\par \par HLD\par - Check lipids before next visit\par - Continue statin\par \par \par Glucose Sensor Necessity:  This patient with diabetes (dx:  E11.65) This patient is currently using the Izabel continuous glucose monitor, scanning at least 4 times day.  The patient is treated with insulin via 3 or more injections daily.   This patient is adjusting their blood glucose based on data from the continuous glucose monitor and uses an insulin sliding scale In addition, the patient has been to our office for an evaluation of their diabetes control within the past 6 months.\par \par Follow up visit in 8 weeks with Dr. Ashford\par \par Overall spent 25 minutes patient and documenting.

## 2022-12-28 ENCOUNTER — RX RENEWAL (OUTPATIENT)
Age: 77
End: 2022-12-28

## 2022-12-29 RX ORDER — ATORVASTATIN CALCIUM 20 MG/1
20 TABLET, FILM COATED ORAL
Qty: 90 | Refills: 1 | Status: DISCONTINUED | COMMUNITY
Start: 2022-06-23 | End: 2022-12-29

## 2023-01-16 ENCOUNTER — OFFICE (OUTPATIENT)
Dept: URBAN - METROPOLITAN AREA CLINIC 105 | Facility: CLINIC | Age: 78
Setting detail: OPHTHALMOLOGY
End: 2023-01-16
Payer: MEDICARE

## 2023-01-16 DIAGNOSIS — H47.393: ICD-10-CM

## 2023-01-16 DIAGNOSIS — H40.1231: ICD-10-CM

## 2023-01-16 DIAGNOSIS — E11.3293: ICD-10-CM

## 2023-01-16 DIAGNOSIS — H16.103: ICD-10-CM

## 2023-01-16 DIAGNOSIS — H16.223: ICD-10-CM

## 2023-01-16 DIAGNOSIS — H35.033: ICD-10-CM

## 2023-01-16 DIAGNOSIS — H43.811: ICD-10-CM

## 2023-01-16 PROCEDURE — 92250 FUNDUS PHOTOGRAPHY W/I&R: CPT | Performed by: OPHTHALMOLOGY

## 2023-01-16 PROCEDURE — 99214 OFFICE O/P EST MOD 30 MIN: CPT | Performed by: OPHTHALMOLOGY

## 2023-01-16 PROCEDURE — 92083 EXTENDED VISUAL FIELD XM: CPT | Performed by: OPHTHALMOLOGY

## 2023-01-16 ASSESSMENT — KERATOMETRY
OS_AXISANGLE_DEGREES: 075
OD_K2POWER_DIOPTERS: 41.75
OS_K2POWER_DIOPTERS: 42.25
OD_AXISANGLE_DEGREES: 090
OD_K1POWER_DIOPTERS: 41.75
OS_K1POWER_DIOPTERS: 41.50

## 2023-01-16 ASSESSMENT — PACHYMETRY
OD_CT_UM: 475
OS_CT_CORRECTION: 5
OD_CT_CORRECTION: 5
OS_CT_UM: 475

## 2023-01-16 ASSESSMENT — REFRACTION_AUTOREFRACTION
OD_CYLINDER: -0.75
OS_CYLINDER: -0.50
OS_AXIS: 100
OD_SPHERE: -0.50
OS_SPHERE: +0.50
OD_AXIS: 098

## 2023-01-16 ASSESSMENT — REFRACTION_MANIFEST
OD_CYLINDER: -0.50
OD_SPHERE: -0.25
OD_VA1: 20/30+1
OD_AXIS: 035
OS_SPHERE: PLANO
OS_CYLINDER: -0.25
OD_ADD: +2.50
OS_ADD: +2.50
OS_VA1: 20/25-1
OS_AXIS: 180

## 2023-01-16 ASSESSMENT — SPHEQUIV_DERIVED
OS_SPHEQUIV: 0.25
OD_SPHEQUIV: -0.875
OD_SPHEQUIV: -0.5

## 2023-01-16 ASSESSMENT — AXIALLENGTH_DERIVED
OS_AL: 24.1022
OD_AL: 24.6184
OD_AL: 24.4603

## 2023-01-16 ASSESSMENT — SUPERFICIAL PUNCTATE KERATITIS (SPK)
OS_SPK: T
OD_SPK: 1+

## 2023-01-16 ASSESSMENT — TONOMETRY: OS_IOP_MMHG: 11

## 2023-01-16 ASSESSMENT — CONFRONTATIONAL VISUAL FIELD TEST (CVF)
OS_FINDINGS: FULL
OD_FINDINGS: FULL

## 2023-01-16 ASSESSMENT — VISUAL ACUITY
OD_BCVA: 20/25-1
OS_BCVA: 20/30-2

## 2023-02-04 ENCOUNTER — APPOINTMENT (OUTPATIENT)
Dept: ENDOCRINOLOGY | Facility: CLINIC | Age: 78
End: 2023-02-04
Payer: MEDICARE

## 2023-02-04 VITALS
BODY MASS INDEX: 30.35 KG/M2 | DIASTOLIC BLOOD PRESSURE: 80 MMHG | HEIGHT: 70 IN | WEIGHT: 212 LBS | OXYGEN SATURATION: 97 % | SYSTOLIC BLOOD PRESSURE: 120 MMHG | HEART RATE: 77 BPM

## 2023-02-04 LAB — GLUCOSE BLDC GLUCOMTR-MCNC: 285

## 2023-02-04 PROCEDURE — 99215 OFFICE O/P EST HI 40 MIN: CPT | Mod: 25

## 2023-02-04 PROCEDURE — 95251 CONT GLUC MNTR ANALYSIS I&R: CPT

## 2023-02-04 PROCEDURE — 82962 GLUCOSE BLOOD TEST: CPT

## 2023-02-04 RX ORDER — PEN NEEDLE, DIABETIC 29 G X1/2"
32G X 4 MM NEEDLE, DISPOSABLE MISCELLANEOUS
Qty: 400 | Refills: 1 | Status: ACTIVE | COMMUNITY
Start: 1900-01-01 | End: 1900-01-01

## 2023-02-04 NOTE — HISTORY OF PRESENT ILLNESS
[FreeTextEntry1] : Interval Hx - no issues, no changes. to have eye procedure on Monday for retinopathy\par \par Type 2 DM\par Severity: uncontrolled \par Duration: 15+ years\par Modifying Factors: worse with diet, vegetarian so lots carbs/vegetable and eats fish, 2 meals 1230 pm and 8 pm\par \par SMBG: Izabel 2 CGM\par CGM downloaded and reviewed: Izabel 2\par Average glucose: 246\par % time CGM active: 82%\par Glucose variability (target <36%): 37\par \par % VERY HIGH (>250): 45\par % HIGH (181-250): 31\par % TARGET (): 23\par % LOW (54-69): 1\par % VERY LOW (<54): 0\par \par Interpretation: erratic control with highs/lows\par \par Current drug regimen: \par Tresiba 60 units in the morning, more adherent\par Novolog scale before Breakfast 9 am, Lunch and DInner - adherent\par does not taking insulin for FS 's, 100-200's 20 units, >200 28 units, >300 30 units\par \par Novolog  20 units, 151-200 22 units, 201-250 24 units, 251-300 26 units, 301+ 30 units\par Tresiba to 60 units in morning\par - adjusted Novolog scale if -300 give 28 units and over 301 give 30 units, continue scale above 251,\par \par Complications: \par (+) CKD follows with Renal\par (+) retinopathy follows Dr Villaseñor (retina) , Dr Card\par (+) neuropathy off gabapentin, (+) gastroparesis\par CAD - follows w cardio\par ===================================\par HLD on Atorvastatin 10 mg daily-adherent

## 2023-02-04 NOTE — CONSULT LETTER
[Dear  ___] : Dear  [unfilled], [Courtesy Letter:] : I had the pleasure of seeing your patient, [unfilled], in my office today. [Please see my note below.] : Please see my note below. [Sincerely,] : Sincerely, [FreeTextEntry3] : Xin Ashford, \par

## 2023-02-04 NOTE — DATA REVIEWED
[FreeTextEntry1] : Labs: \par 10/16/20\par A1C 10.2%\par Creatinine 1.48\par GFR 47\par Cholesterol 196\par Triglycerides 185\par Vitamin D 26.9\par \par labs 1/11/21 GFR 54. \par \par Labs 10/25/21\par Gluc 300\par Cr 1.46, GFR 47\par LDL 83, HDL 38, Tg 142\par A1c 11.5\par \par "Labs 6/16/22\par Cr 1.86 - f/u renal\par LDL chol 98, Tg 172 - elevated, watch diet, increase atorvastatin to 20 mg nightly\par normal thyroid\par urine alb negative\par A1c 10.9 - too high"\par \par Labs 10/2022\par , trig 136, HDL 45\par A1c 10%, fructosamine 351

## 2023-02-04 NOTE — ASSESSMENT
[FreeTextEntry1] : Type 2 DM comp CKD, retinopathy and neuropathy - erratic control on Izabel DL due to gastroparesis, CKD and he has been taking varying amounts of insulin premeals as well as 30 units anytimg sugar is above 250\par - updated labs needed\par - cont Tresiba 60 units daily (he has been more adherent with this)\par - use this scale before meals Novolog scale  24, 151-200 26, 201-250 28, 251-300 30, 301-350 32, 351-400 34, 401+ 36\par - can take Novolog 10 units as a correction (at non-meal times) for FS >250\par - discussed discrepancy bwt glucometer sugar and sensir sugar\par - given gastroparesis, use simple carbs without protein/fat to treat lows\par - RTO 4-6 weeks w NP to review Izabel DL and adjust insulin \par - not candidate for GLP1a due to gastroparesis and active retinopathy requiring tx, not candidate for DPP4i due to gastroparesis, consider SGLT2i if labs okay\par \par HLD\par - Continue statin, update labs needed\par \par \par Glucose Sensor Necessity:  This patient with diabetes (dx:  E11.65) This patient is currently using the Izabel continuous glucose monitor, scanning at least 4 times day.  The patient is treated with insulin via 3 or more injections daily.   This patient is adjusting their blood glucose based on data from the continuous glucose monitor and uses an insulin sliding scale In addition, the patient has been to our office for an evaluation of their diabetes control within the past 6 months.\par \par 40 min spent counseling on review of Izabel CGM, hyperglycemia risks/long term effet, insulin adherence and review of insulin dosing changes, written instruction provided\par

## 2023-02-06 ENCOUNTER — OFFICE (OUTPATIENT)
Dept: URBAN - METROPOLITAN AREA CLINIC 103 | Facility: CLINIC | Age: 78
Setting detail: OPHTHALMOLOGY
End: 2023-02-06
Payer: MEDICARE

## 2023-02-06 DIAGNOSIS — H40.1231: ICD-10-CM

## 2023-02-06 DIAGNOSIS — H16.223: ICD-10-CM

## 2023-02-06 DIAGNOSIS — H47.393: ICD-10-CM

## 2023-02-06 DIAGNOSIS — H43.811: ICD-10-CM

## 2023-02-06 DIAGNOSIS — E11.3293: ICD-10-CM

## 2023-02-06 DIAGNOSIS — H35.033: ICD-10-CM

## 2023-02-06 DIAGNOSIS — H16.103: ICD-10-CM

## 2023-02-06 PROCEDURE — 92020 GONIOSCOPY: CPT | Performed by: OPHTHALMOLOGY

## 2023-02-06 PROCEDURE — 92012 INTRM OPH EXAM EST PATIENT: CPT | Performed by: OPHTHALMOLOGY

## 2023-02-06 ASSESSMENT — KERATOMETRY
OS_AXISANGLE_DEGREES: 082
OD_K1POWER_DIOPTERS: 40.75
OS_K2POWER_DIOPTERS: 42.75
OD_K2POWER_DIOPTERS: 41.75
OS_K1POWER_DIOPTERS: 41.50
OD_AXISANGLE_DEGREES: 103

## 2023-02-06 ASSESSMENT — CONFRONTATIONAL VISUAL FIELD TEST (CVF)
OD_FINDINGS: FULL
OS_FINDINGS: FULL

## 2023-02-06 ASSESSMENT — SUPERFICIAL PUNCTATE KERATITIS (SPK)
OS_SPK: T
OD_SPK: 1+

## 2023-02-06 ASSESSMENT — REFRACTION_MANIFEST
OD_AXIS: 035
OD_CYLINDER: -0.50
OS_CYLINDER: -0.25
OD_SPHERE: -0.25
OS_SPHERE: PLANO
OS_ADD: +2.50
OS_AXIS: 180
OS_VA1: 20/25-1
OD_VA1: 20/30+1
OD_ADD: +2.50

## 2023-02-06 ASSESSMENT — PACHYMETRY
OS_CT_CORRECTION: 5
OD_CT_CORRECTION: 5
OS_CT_UM: 475
OD_CT_UM: 475

## 2023-02-06 ASSESSMENT — AXIALLENGTH_DERIVED: OD_AL: 24.6594

## 2023-02-06 ASSESSMENT — REFRACTION_AUTOREFRACTION
OD_SPHERE: UTP
OD_AXIS: UTP
OD_CYLINDER: UTP
OS_SPHERE: UTP
OS_CYLINDER: UTP
OS_AXIS: UTP

## 2023-02-06 ASSESSMENT — TONOMETRY
OD_IOP_MMHG: 11
OS_IOP_MMHG: 11

## 2023-02-06 ASSESSMENT — VISUAL ACUITY
OS_BCVA: 20/30+1
OD_BCVA: 20/25-1

## 2023-02-06 ASSESSMENT — SPHEQUIV_DERIVED: OD_SPHEQUIV: -0.5

## 2023-03-08 ENCOUNTER — NON-APPOINTMENT (OUTPATIENT)
Age: 78
End: 2023-03-08

## 2023-04-17 LAB
HBA1C MFR BLD HPLC: 9.4
LDLC SERPL DIRECT ASSAY-MCNC: 57

## 2023-04-18 ENCOUNTER — APPOINTMENT (OUTPATIENT)
Dept: ENDOCRINOLOGY | Facility: CLINIC | Age: 78
End: 2023-04-18
Payer: MEDICARE

## 2023-04-18 PROCEDURE — 99443: CPT | Mod: 95

## 2023-04-18 NOTE — DATA REVIEWED
[FreeTextEntry1] : Labs: \par 10/16/20\par A1C 10.2%\par Creatinine 1.48\par GFR 47\par Cholesterol 196\par Triglycerides 185\par Vitamin D 26.9\par \par labs 1/11/21 GFR 54. \par \par Labs 10/25/21\par Gluc 300\par Cr 1.46, GFR 47\par LDL 83, HDL 38, Tg 142\par A1c 11.5\par \par "Labs 6/16/22\par Cr 1.86 - f/u renal\par LDL chol 98, Tg 172 - elevated, watch diet, increase atorvastatin to 20 mg nightly\par normal thyroid\par urine alb negative\par A1c 10.9 - too high"\par \par Labs 10/2022\par , trig 136, HDL 45\par A1c 10%, fructosamine 351. \par \par

## 2023-04-18 NOTE — ASSESSMENT
[FreeTextEntry1] : Type 2 DM comp CKD, retinopathy and neuropathy - erratic control on Izabel DL due to gastroparesis, CKD and he has been taking varying amounts of insulin premeals as well as 30 units anytimg sugar is above 250\par - updated labs needed\par - cont Tresiba 60 units daily (he has been more adherent with this)\par - use this scale before meals Novolog scale  24, 151-200 26, 201-250 28, 251-300 30, 301-350 32, 351-400 34, 401+ 36\par - can take Novolog 10 units as a correction (at non-meal times) for FS >250\par - discussed discrepancy bwt glucometer sugar and sensir sugar\par - given gastroparesis, use simple carbs without protein/fat to treat lows\par - RTO 4-6 weeks w NP to review Izabel DL and adjust insulin \par - not candidate for GLP1a due to gastroparesis and active retinopathy requiring tx, not candidate for DPP4i due to gastroparesis, consider SGLT2i if labs okay\par \par HLD\par - Continue statin, update labs needed\par \par \par Glucose Sensor Necessity: This patient with diabetes (dx: E11.65) This patient is currently using the Izabel continuous glucose monitor, scanning at least 4 times day. The patient is treated with insulin via 3 or more injections daily. This patient is adjusting their blood glucose based on data from the continuous glucose monitor and uses an insulin sliding scale In addition, the patient has been to our office for an evaluation of their diabetes control within the past 6 months.\par \par

## 2023-04-18 NOTE — HISTORY OF PRESENT ILLNESS
[Home] : at home, [unfilled] , at the time of the visit. [Other Location: e.g. Home (Enter Location, City,State)___] : at [unfilled] [Verbal consent obtained from patient] : the patient, [unfilled] [FreeTextEntry1] : Interval Hx - no issues, no changes. to have eye procedure on Monday for retinopathy\par \par Type 2 DM\par Severity: uncontrolled \par Duration: 15+ years\par Modifying Factors: worse with diet, vegetarian so lots carbs/vegetable and eats fish, 2 meals 1230 pm and 8 pm\par \par SMBG: Izabel 2 CGM\par CGM downloaded and reviewed: Izabel 2\par Average glucose: 246\par % time CGM active: 82%\par Glucose variability (target <36%): 37\par \par % VERY HIGH (>250): 45\par % HIGH (181-250): 31\par % TARGET (): 23\par % LOW (54-69): 1\par % VERY LOW (<54): 0\par \par Interpretation: erratic control with highs/lows\par \par Current drug regimen: \par Tresiba 60 units in the morning, more adherent\par Novolog scale before Breakfast 9 am, Lunch and DInner - adherent\par does not taking insulin for FS 's, 100-200's 20 units, >200 28 units, >300 30 units\par \par Novolog  20 units, 151-200 22 units, 201-250 24 units, 251-300 26 units, 301+ 30 units\par Tresiba to 60 units in morning\par - adjusted Novolog scale if -300 give 28 units and over 301 give 30 units, continue scale above 251,\par \par Complications: \par (+) CKD follows with Renal\par (+) retinopathy follows Dr Villaseñor (retina) , Dr Card\par (+) neuropathy off gabapentin, (+) gastroparesis\par CAD - follows w cardio\par ===================================\par HLD on Atorvastatin 10 mg daily-adherent

## 2023-04-26 ENCOUNTER — APPOINTMENT (OUTPATIENT)
Dept: GASTROENTEROLOGY | Facility: CLINIC | Age: 78
End: 2023-04-26
Payer: MEDICARE

## 2023-04-26 DIAGNOSIS — K31.89 OTHER DISEASES OF STOMACH AND DUODENUM: ICD-10-CM

## 2023-04-26 DIAGNOSIS — R93.5 ABNORMAL FINDINGS ON DIAGNOSTIC IMAGING OF OTHER ABDOMINAL REGIONS, INCLUDING RETROPERITONEUM: ICD-10-CM

## 2023-04-26 PROCEDURE — 99442: CPT | Mod: 95

## 2023-04-26 NOTE — HISTORY OF PRESENT ILLNESS
[FreeTextEntry1] : Due to COVID 19 pandemic, telephonic visit was scheduled to decrease any chance of exposure. Verbal consent was obtained from the patient.\par The patient was at home and I was at [address] [place] [address]., [facility].  The patient was referred by Dr. David Morgan for gastric wall thickening.  The patient had previous EGD performed which had some food material noted.  He has history of diabetes and also blood clot.  He has been on Xarelto.  He is denying any complaints currently.

## 2023-04-26 NOTE — ASSESSMENT
[FreeTextEntry1] : We will schedule the patient for EGD and EUS.  This will evaluate gastric wall and in the perigastric area.  He needs to be on full liquid diet for 2 days prior to the procedure.  Will hold Xarelto 48 hours prior to the procedure.\par \par I spent 11 minutes on the encounter\par \par Travis Travis MD\par Gastroenterology \par \par

## 2023-05-15 ENCOUNTER — TRANSCRIPTION ENCOUNTER (OUTPATIENT)
Age: 78
End: 2023-05-15

## 2023-05-16 ENCOUNTER — RESULT REVIEW (OUTPATIENT)
Age: 78
End: 2023-05-16

## 2023-05-16 ENCOUNTER — OUTPATIENT (OUTPATIENT)
Dept: OUTPATIENT SERVICES | Facility: HOSPITAL | Age: 78
LOS: 1 days | End: 2023-05-16
Payer: MEDICARE

## 2023-05-16 ENCOUNTER — APPOINTMENT (OUTPATIENT)
Dept: GASTROENTEROLOGY | Facility: HOSPITAL | Age: 78
End: 2023-05-16

## 2023-05-16 DIAGNOSIS — K31.89 OTHER DISEASES OF STOMACH AND DUODENUM: ICD-10-CM

## 2023-05-16 DIAGNOSIS — R93.5 ABNORMAL FINDINGS ON DIAGNOSTIC IMAGING OF OTHER ABDOMINAL REGIONS, INCLUDING RETROPERITONEUM: ICD-10-CM

## 2023-05-16 LAB — GLUCOSE BLDC GLUCOMTR-MCNC: 239 MG/DL — HIGH (ref 70–99)

## 2023-05-16 PROCEDURE — 43239 EGD BIOPSY SINGLE/MULTIPLE: CPT

## 2023-05-16 PROCEDURE — 88305 TISSUE EXAM BY PATHOLOGIST: CPT

## 2023-05-16 PROCEDURE — 82962 GLUCOSE BLOOD TEST: CPT

## 2023-05-16 PROCEDURE — 88342 IMHCHEM/IMCYTCHM 1ST ANTB: CPT

## 2023-05-16 PROCEDURE — 43259 EGD US EXAM DUODENUM/JEJUNUM: CPT

## 2023-05-16 PROCEDURE — 88305 TISSUE EXAM BY PATHOLOGIST: CPT | Mod: 26

## 2023-05-16 PROCEDURE — 88342 IMHCHEM/IMCYTCHM 1ST ANTB: CPT | Mod: 26

## 2023-05-16 PROCEDURE — 43237 ENDOSCOPIC US EXAM ESOPH: CPT

## 2023-05-19 LAB — SURGICAL PATHOLOGY STUDY: SIGNIFICANT CHANGE UP

## 2023-05-22 ENCOUNTER — OFFICE (OUTPATIENT)
Dept: URBAN - METROPOLITAN AREA CLINIC 103 | Facility: CLINIC | Age: 78
Setting detail: OPHTHALMOLOGY
End: 2023-05-22
Payer: MEDICARE

## 2023-05-22 DIAGNOSIS — H35.033: ICD-10-CM

## 2023-05-22 DIAGNOSIS — H16.223: ICD-10-CM

## 2023-05-22 DIAGNOSIS — H16.103: ICD-10-CM

## 2023-05-22 DIAGNOSIS — E11.3293: ICD-10-CM

## 2023-05-22 DIAGNOSIS — H40.1231: ICD-10-CM

## 2023-05-22 DIAGNOSIS — H43.811: ICD-10-CM

## 2023-05-22 PROCEDURE — 92083 EXTENDED VISUAL FIELD XM: CPT | Performed by: OPHTHALMOLOGY

## 2023-05-22 PROCEDURE — 92012 INTRM OPH EXAM EST PATIENT: CPT | Performed by: OPHTHALMOLOGY

## 2023-05-22 ASSESSMENT — REFRACTION_AUTOREFRACTION
OD_SPHERE: UTP
OS_SPHERE: UTP

## 2023-05-22 ASSESSMENT — REFRACTION_MANIFEST
OS_ADD: +2.50
OS_VA1: 20/25-1
OS_AXIS: 180
OS_SPHERE: PLANO
OD_ADD: +2.50
OS_CYLINDER: -0.25
OD_CYLINDER: -0.50
OD_AXIS: 035
OD_VA1: 20/30+1
OD_SPHERE: -0.25

## 2023-05-22 ASSESSMENT — VISUAL ACUITY
OD_BCVA: 20/25-1
OS_BCVA: 20/30-2

## 2023-05-22 ASSESSMENT — SUPERFICIAL PUNCTATE KERATITIS (SPK)
OS_SPK: T
OD_SPK: 1+

## 2023-05-22 ASSESSMENT — CONFRONTATIONAL VISUAL FIELD TEST (CVF)
OS_FINDINGS: FULL
OD_FINDINGS: FULL

## 2023-05-22 ASSESSMENT — PACHYMETRY
OD_CT_UM: 475
OS_CT_CORRECTION: 5
OD_CT_CORRECTION: 5
OS_CT_UM: 475

## 2023-05-22 ASSESSMENT — KERATOMETRY
OD_AXISANGLE_DEGREES: 118
OD_K2POWER_DIOPTERS: 41.75
OS_K1POWER_DIOPTERS: 40.00
OS_K2POWER_DIOPTERS: 41.50
OS_AXISANGLE_DEGREES: 086
OD_K1POWER_DIOPTERS: 41.50

## 2023-05-22 ASSESSMENT — AXIALLENGTH_DERIVED: OD_AL: 24.5098

## 2023-05-22 ASSESSMENT — SPHEQUIV_DERIVED: OD_SPHEQUIV: -0.5

## 2023-05-22 ASSESSMENT — TONOMETRY
OS_IOP_MMHG: 11
OD_IOP_MMHG: 11

## 2023-05-23 ENCOUNTER — NON-APPOINTMENT (OUTPATIENT)
Age: 78
End: 2023-05-23

## 2023-06-02 LAB
MICROALBUMIN/CREAT 24H UR-RTO: 3
TSH SERPL-ACNC: 2.52

## 2023-06-02 NOTE — DATA REVIEWED
[FreeTextEntry1] : no recent labs\par \par CGMS downloaded and reviewed: Izabel personal\par \par Average glucose: 189\par SD: 55\par Calibrations per day: n/a\par \par % HIGH: 57\par % TARGET: 42\par % LOW: 1\par \par Interpretation: not scanning enough - incomplete data, occasional fasting hypoglycemia, hyperglycemia towards end of the day\par \par  PAST SURGICAL HISTORY:  Calcaneus fracture s/p repair with hardware    H/O colonoscopy     H/O lipoma removed from back    H/O percutaneous transluminal coronary angioplasty with DE RCA 2006, 2 stents placed 2015    History of herniorrhaphy left inguinal herniorrhaphy    S/P cardiac catheterization 2017-resulted in [place]-no stents placed    S/P hernia surgery     S/P small bowel resection

## 2023-06-06 ENCOUNTER — APPOINTMENT (OUTPATIENT)
Dept: ENDOCRINOLOGY | Facility: CLINIC | Age: 78
End: 2023-06-06
Payer: MEDICARE

## 2023-06-06 VITALS
OXYGEN SATURATION: 98 % | WEIGHT: 223 LBS | DIASTOLIC BLOOD PRESSURE: 70 MMHG | BODY MASS INDEX: 31.92 KG/M2 | HEIGHT: 70 IN | HEART RATE: 68 BPM | SYSTOLIC BLOOD PRESSURE: 124 MMHG

## 2023-06-06 LAB — GLUCOSE BLDC GLUCOMTR-MCNC: 377

## 2023-06-06 PROCEDURE — 99215 OFFICE O/P EST HI 40 MIN: CPT | Mod: 25

## 2023-06-06 PROCEDURE — 95251 CONT GLUC MNTR ANALYSIS I&R: CPT

## 2023-06-06 PROCEDURE — 82962 GLUCOSE BLOOD TEST: CPT

## 2023-06-06 RX ORDER — CLONIDINE HYDROCHLORIDE 0.1 MG/1
0.1 TABLET ORAL
Qty: 60 | Refills: 0 | Status: DISCONTINUED | COMMUNITY
Start: 2022-09-13 | End: 2023-06-06

## 2023-06-06 NOTE — DATA REVIEWED
[FreeTextEntry1] : Labs: \par 10/16/20\par A1C 10.2%\par Creatinine 1.48\par GFR 47\par Cholesterol 196\par Triglycerides 185\par Vitamin D 26.9\par \par labs 1/11/21 GFR 54. \par \par Labs 10/25/21\par Gluc 300\par Cr 1.46, GFR 47\par LDL 83, HDL 38, Tg 142\par A1c 11.5\par \par "Labs 6/16/22\par Cr 1.86 - f/u renal\par LDL chol 98, Tg 172 - elevated, watch diet, increase atorvastatin to 20 mg nightly\par normal thyroid\par urine alb negative\par A1c 10.9 - too high"\par \par Labs 10/2022\par , trig 136, HDL 45\par A1c 10%, fructosamine 351\par \par labs 3/7/23\par Cr 1.84, GFR 38\par LDL 57\par TSh 2.52\par A1c 9.4\par urine alb/Cr neg

## 2023-06-06 NOTE — ASSESSMENT
[FreeTextEntry1] : 77 yr old male with:\par Type 2 DM comp CKD, retinopathy and neuropathy - worsening glycemic control, mostly high after meals \par - increase Tresiba to 64 units\par - reduce Novolog scale to level he is more comfortable using and advised that he use this scale for all meals and avoid taking less insulin which is causing pp hyperglycemia. New Novolog scale  12. 151-200 14. 201-250 16, 251-300 18, 301+ 20 units\par - avoid taking frequent insulin boluses to correct for hyperglycemia whichh can lead to insulin stacking and hypoglycemia\par - labs from Dr Crump, if ok, trial of jardiance 10 mg daily\par - given gastroparesis, use simple carbs without protein/fat to treat lows\par - not candidate for GLP1a due to gastroparesis, not candidate for DPP4i due to gastroparesis\par \par HLD\par - Continue statin\par \par \par Glucose Sensor Necessity:  This patient with diabetes (dx:  E11.65) This patient is currently using the Izabel continuous glucose monitor, scanning at least 4 times day.  The patient is treated with insulin via 3 or more injections daily.   This patient is adjusting their blood glucose based on data from the continuous glucose monitor and uses an insulin sliding scale In addition, the patient has been to our office for an evaluation of their diabetes control within the past 6 months.\par \par \par

## 2023-06-06 NOTE — HISTORY OF PRESENT ILLNESS
[FreeTextEntry1] : Interval Hx - has been watching diet - eating salads, minimal carbs, no sugar and glucoses have been high\par reports erratic control, does not always take insulin before meals and takes less amount, has also been taking extra insulin for high sugars\par \par Type 2 DM\par Severity: uncontrolled \par Duration: 15+ years\par Modifying Factors: worse with diet, vegetarian so lots carbs/vegetable and eats fish, 2 meals 1230 pm and 8 pm\par \par SMBG: Izabel 2 CGM\par CGM downloaded and reviewed: Izabel 2\par Average glucose: 233\par % time CGM active: 88%\par Glucose variability (target <36%): 26\par \par % VERY HIGH (>250): 39\par % HIGH (181-250): 41\par % TARGET (): 20\par % LOW (54-69): 0\par % VERY LOW (<54): 0\par \par Interpretation: mostly high radha after meals\par \par Current drug regimen: \par Tresiba 60 units daily (he has been more adherent with this)\par  before meals Novolog scale  24, 151-200 26, 201-250 28, 251-300 30, 301-350 32, 351-400 34, 401+ 36\par - can take Novolog 10 units as a correction (at non-meal times) for FS >250\par \par Complications: \par (+) CKD follows with Renal\par (+) retinopathy follows Dr Villaseñor (retina) , Dr Card\par (+) neuropathy off gabapentin, (+) gastroparesis\par CAD - follows w cardio\par ===================================\par HLD on Atorvastatin 10 mg daily-adherent

## 2023-08-02 ENCOUNTER — RX RENEWAL (OUTPATIENT)
Age: 78
End: 2023-08-02

## 2023-08-02 RX ORDER — INSULIN ASPART 100 [IU]/ML
100 INJECTION, SOLUTION INTRAVENOUS; SUBCUTANEOUS
Qty: 9 | Refills: 1 | Status: ACTIVE | COMMUNITY
Start: 2019-02-25 | End: 1900-01-01

## 2023-08-13 ENCOUNTER — RX RENEWAL (OUTPATIENT)
Age: 78
End: 2023-08-13

## 2023-08-28 ENCOUNTER — OFFICE (OUTPATIENT)
Dept: URBAN - METROPOLITAN AREA CLINIC 103 | Facility: CLINIC | Age: 78
Setting detail: OPHTHALMOLOGY
End: 2023-08-28
Payer: MEDICARE

## 2023-08-28 DIAGNOSIS — H16.223: ICD-10-CM

## 2023-08-28 DIAGNOSIS — H35.033: ICD-10-CM

## 2023-08-28 DIAGNOSIS — E11.3293: ICD-10-CM

## 2023-08-28 DIAGNOSIS — H40.1231: ICD-10-CM

## 2023-08-28 DIAGNOSIS — H16.103: ICD-10-CM

## 2023-08-28 DIAGNOSIS — H43.811: ICD-10-CM

## 2023-08-28 PROCEDURE — 92012 INTRM OPH EXAM EST PATIENT: CPT | Performed by: OPHTHALMOLOGY

## 2023-08-28 PROCEDURE — 92133 CPTRZD OPH DX IMG PST SGM ON: CPT | Performed by: OPHTHALMOLOGY

## 2023-08-28 ASSESSMENT — SUPERFICIAL PUNCTATE KERATITIS (SPK)
OD_SPK: 1+
OS_SPK: T

## 2023-08-28 ASSESSMENT — REFRACTION_MANIFEST
OS_SPHERE: PLANO
OD_AXIS: 035
OS_ADD: +2.50
OD_SPHERE: -0.25
OS_AXIS: 180
OD_CYLINDER: -0.50
OD_VA1: 20/30+1
OS_CYLINDER: -0.25
OS_VA1: 20/25-1
OD_ADD: +2.50

## 2023-08-28 ASSESSMENT — CONFRONTATIONAL VISUAL FIELD TEST (CVF)
OS_FINDINGS: FULL
OD_FINDINGS: FULL

## 2023-08-28 ASSESSMENT — PACHYMETRY
OD_CT_UM: 475
OD_CT_CORRECTION: 5
OS_CT_UM: 475
OS_CT_CORRECTION: 5

## 2023-08-28 ASSESSMENT — KERATOMETRY
OD_AXISANGLE_DEGREES: 079
OD_K1POWER_DIOPTERS: 41.00
OS_K1POWER_DIOPTERS: 41.50
OD_K2POWER_DIOPTERS: 42.00

## 2023-08-28 ASSESSMENT — TONOMETRY
OD_IOP_MMHG: 11
OD_IOP_MMHG: 10
OS_IOP_MMHG: 10
OS_IOP_MMHG: 11

## 2023-08-28 ASSESSMENT — REFRACTION_AUTOREFRACTION
OS_SPHERE: UTP
OD_SPHERE: UTP

## 2023-08-28 ASSESSMENT — VISUAL ACUITY
OS_BCVA: 20/25-1
OD_BCVA: 20/25-1

## 2023-08-28 ASSESSMENT — SPHEQUIV_DERIVED: OD_SPHEQUIV: -0.5

## 2023-08-28 ASSESSMENT — AXIALLENGTH_DERIVED: OD_AL: 24.5594

## 2023-08-31 ENCOUNTER — APPOINTMENT (OUTPATIENT)
Dept: ENDOCRINOLOGY | Facility: CLINIC | Age: 78
End: 2023-08-31
Payer: MEDICARE

## 2023-08-31 VITALS
SYSTOLIC BLOOD PRESSURE: 124 MMHG | HEIGHT: 70 IN | DIASTOLIC BLOOD PRESSURE: 80 MMHG | BODY MASS INDEX: 29.35 KG/M2 | HEART RATE: 78 BPM | WEIGHT: 205 LBS

## 2023-08-31 DIAGNOSIS — R53.82 CHRONIC FATIGUE, UNSPECIFIED: ICD-10-CM

## 2023-08-31 LAB — GLUCOSE BLDC GLUCOMTR-MCNC: 182

## 2023-08-31 PROCEDURE — 99214 OFFICE O/P EST MOD 30 MIN: CPT | Mod: 25

## 2023-08-31 PROCEDURE — 82962 GLUCOSE BLOOD TEST: CPT

## 2023-08-31 NOTE — HISTORY OF PRESENT ILLNESS
[FreeTextEntry1] : Interval Hx - no issues, no changes. to have eye procedure on Monday for retinopathy  Type 2 DM Severity: uncontrolled  Duration: 15+ years Modifying Factors: worse with diet, vegetarian so lots carbs/vegetable and eats fish, 2 meals 1230 pm and 8 pm  SMBG: Izabel 2 CGM CGM downloaded and reviewed: Izabel 2 forgot reader today   Pt c/o low sugar of 86--> did not check with with fingerstick, animal crackers and life savers, ice cream bar  This morning fasting 306  As per patient sugars have been below 200  Current drug regimen:  Tresiba to 64 units Novolog scale to level he is more comfortable using and advised that he use this scale for all meals and avoid taking less insulin which is causing pp hyperglycemia. New Novolog scale  12. 151-200 14. 201-250 16, 251-300 18, 301+ 20 units Jardiance 10- mg QD (tolerating well, patient seeing better numbers)   Weight -down 18 pounds  Exercise: a little bit, but hurt his wrist  Diet: salads, tuna, watching carbs more has a good dinner tho with carbs   Complications:  (+) CKD follows with Renal (+) retinopathy follows Dr Villaseñor (retina) , Dr Card, last appt a few days ago, started nasal spray for dry eyes, DR omer (+) neuropathy off gabapentin, (+) gastroparesis CAD - follows w cardio =================================== HLD on Atorvastatin 10 mg daily-adherent   Patient c/o increased fatigue, hard to do anything. He is not taking creatine or pre-workout anymore since he has not been working out. states he takes many Vitamins

## 2023-08-31 NOTE — ASSESSMENT
[Diabetes Foot Care] : diabetes foot care [Carbohydrate Consistent Diet] : carbohydrate consistent diet [Importance of Diet and Exercise] : importance of diet and exercise to improve glycemic control, achieve weight loss and improve cardiovascular health [Exercise/Effect on Glucose] : exercise/effect on glucose [Hypoglycemia Management] : hypoglycemia management [Retinopathy Screening] : Patient was referred to ophthalmology for retinopathy screening [FreeTextEntry1] : Type 2 DM comp CKD, retinopathy and neuropathy - erratic control on Izabel DL due to gastroparesis, CKD and he has been taking varying amounts of insulin premeals as well as 30 units anytimg sugar is above 250 - updated labs needed - cont Tresiba 60 units daily (he has been more adherent with this) - Continue mealtime Novolog scale  24, 151-200 26, 201-250 28, 251-300 30, 301-350 32, 351-400 34, 401+ 36 - can take Novolog 10 units as a correction (at non-meal times) for FS >250 - discussed discrepancy bwt glucometer sugar and sensor sugar--> when he has a low blood sugar if he feels fine, check with FS, pt verbalized understanding  - given gastroparesis, use simple carbs without protein/fat to treat lows - not candidate for GLP1a due to gastroparesis and active retinopathy requiring tx, not candidate for DPP4i due to gastroparesis -Will drop off izabel reader in the next few days   HLD - Continue statin, update labs needed  Fatigue -Check TFTs, Vit D and Vit b12  Glucose Sensor Necessity: This patient with diabetes (dx: E11.65) This patient is currently using the Izabel continuous glucose monitor, scanning at least 4 times day. The patient is treated with insulin via 3 or more injections daily. This patient is adjusting their blood glucose based on data from the continuous glucose monitor and uses an insulin sliding scale In addition, the patient has been to our office for an evaluation of their diabetes control within the past 6 months.

## 2023-10-06 LAB
HBA1C MFR BLD HPLC: 11.3
LDLC SERPL DIRECT ASSAY-MCNC: 72

## 2023-10-07 ENCOUNTER — APPOINTMENT (OUTPATIENT)
Dept: ENDOCRINOLOGY | Facility: CLINIC | Age: 78
End: 2023-10-07
Payer: MEDICARE

## 2023-10-07 VITALS
OXYGEN SATURATION: 95 % | WEIGHT: 203 LBS | HEART RATE: 77 BPM | SYSTOLIC BLOOD PRESSURE: 110 MMHG | HEIGHT: 70 IN | DIASTOLIC BLOOD PRESSURE: 70 MMHG | BODY MASS INDEX: 29.06 KG/M2

## 2023-10-07 LAB — GLUCOSE BLDC GLUCOMTR-MCNC: 298

## 2023-10-07 PROCEDURE — 82962 GLUCOSE BLOOD TEST: CPT

## 2023-10-07 PROCEDURE — 95251 CONT GLUC MNTR ANALYSIS I&R: CPT

## 2023-10-07 PROCEDURE — 99214 OFFICE O/P EST MOD 30 MIN: CPT | Mod: 25

## 2023-10-07 RX ORDER — EMPAGLIFLOZIN 10 MG/1
10 TABLET, FILM COATED ORAL DAILY
Qty: 90 | Refills: 0 | Status: DISCONTINUED | COMMUNITY
Start: 2023-08-02 | End: 2023-10-07

## 2023-11-09 ENCOUNTER — APPOINTMENT (OUTPATIENT)
Dept: ENDOCRINOLOGY | Facility: CLINIC | Age: 78
End: 2023-11-09
Payer: MEDICARE

## 2023-11-09 VITALS
WEIGHT: 210 LBS | BODY MASS INDEX: 30.06 KG/M2 | DIASTOLIC BLOOD PRESSURE: 74 MMHG | HEART RATE: 71 BPM | HEIGHT: 70 IN | SYSTOLIC BLOOD PRESSURE: 126 MMHG

## 2023-11-09 LAB
GLUCOSE BLDC GLUCOMTR-MCNC: 307
HBA1C MFR BLD HPLC: 10.1
LDLC SERPL DIRECT ASSAY-MCNC: 76

## 2023-11-09 PROCEDURE — 82962 GLUCOSE BLOOD TEST: CPT

## 2023-11-09 PROCEDURE — 95251 CONT GLUC MNTR ANALYSIS I&R: CPT

## 2023-11-09 PROCEDURE — 99214 OFFICE O/P EST MOD 30 MIN: CPT | Mod: 25

## 2023-12-22 ENCOUNTER — APPOINTMENT (OUTPATIENT)
Dept: ENDOCRINOLOGY | Facility: CLINIC | Age: 78
End: 2023-12-22

## 2024-01-08 ENCOUNTER — OFFICE (OUTPATIENT)
Dept: URBAN - METROPOLITAN AREA CLINIC 103 | Facility: CLINIC | Age: 79
Setting detail: OPHTHALMOLOGY
End: 2024-01-08
Payer: MEDICARE

## 2024-01-08 DIAGNOSIS — E11.3293: ICD-10-CM

## 2024-01-08 DIAGNOSIS — H16.223: ICD-10-CM

## 2024-01-08 DIAGNOSIS — H40.1231: ICD-10-CM

## 2024-01-08 DIAGNOSIS — H35.033: ICD-10-CM

## 2024-01-08 DIAGNOSIS — H16.103: ICD-10-CM

## 2024-01-08 DIAGNOSIS — H43.811: ICD-10-CM

## 2024-01-08 PROCEDURE — 92014 COMPRE OPH EXAM EST PT 1/>: CPT | Performed by: OPHTHALMOLOGY

## 2024-01-08 PROCEDURE — 92250 FUNDUS PHOTOGRAPHY W/I&R: CPT | Performed by: OPHTHALMOLOGY

## 2024-01-08 ASSESSMENT — SUPERFICIAL PUNCTATE KERATITIS (SPK)
OD_SPK: 1+
OS_SPK: T

## 2024-01-08 ASSESSMENT — REFRACTION_MANIFEST
OD_AXIS: 035
OS_VA1: 20/25-1
OS_SPHERE: PLANO
OS_AXIS: 180
OD_SPHERE: -0.25
OD_VA1: 20/30+1
OD_ADD: +2.50
OD_CYLINDER: -0.50
OS_ADD: +2.50
OS_CYLINDER: -0.25

## 2024-01-08 ASSESSMENT — SPHEQUIV_DERIVED: OD_SPHEQUIV: -0.5

## 2024-01-08 ASSESSMENT — CONFRONTATIONAL VISUAL FIELD TEST (CVF)
OD_FINDINGS: FULL
OS_FINDINGS: FULL

## 2024-01-08 ASSESSMENT — REFRACTION_AUTOREFRACTION
OD_SPHERE: UTP
OS_SPHERE: UTP

## 2024-01-18 ENCOUNTER — NON-APPOINTMENT (OUTPATIENT)
Age: 79
End: 2024-01-18

## 2024-01-19 ENCOUNTER — NON-APPOINTMENT (OUTPATIENT)
Age: 79
End: 2024-01-19

## 2024-01-23 ENCOUNTER — NON-APPOINTMENT (OUTPATIENT)
Age: 79
End: 2024-01-23

## 2024-01-24 RX ORDER — FLASH GLUCOSE SENSOR
KIT MISCELLANEOUS
Qty: 6 | Refills: 3 | Status: DISCONTINUED | COMMUNITY
Start: 2019-01-21 | End: 2024-01-24

## 2024-01-24 RX ORDER — FLASH GLUCOSE SCANNING READER
EACH MISCELLANEOUS
Qty: 1 | Refills: 0 | Status: ACTIVE | COMMUNITY
Start: 2024-01-19 | End: 1900-01-01

## 2024-01-27 ENCOUNTER — APPOINTMENT (OUTPATIENT)
Dept: ENDOCRINOLOGY | Facility: CLINIC | Age: 79
End: 2024-01-27
Payer: MEDICARE

## 2024-01-27 VITALS
OXYGEN SATURATION: 96 % | WEIGHT: 220 LBS | BODY MASS INDEX: 31.5 KG/M2 | HEIGHT: 70 IN | HEART RATE: 73 BPM | DIASTOLIC BLOOD PRESSURE: 72 MMHG | SYSTOLIC BLOOD PRESSURE: 110 MMHG

## 2024-01-27 LAB — GLUCOSE BLDC GLUCOMTR-MCNC: 169

## 2024-01-27 PROCEDURE — 95251 CONT GLUC MNTR ANALYSIS I&R: CPT

## 2024-01-27 PROCEDURE — 99214 OFFICE O/P EST MOD 30 MIN: CPT

## 2024-01-27 PROCEDURE — 82962 GLUCOSE BLOOD TEST: CPT

## 2024-01-27 PROCEDURE — G2211 COMPLEX E/M VISIT ADD ON: CPT

## 2024-02-08 RX ORDER — ATORVASTATIN CALCIUM 80 MG/1
80 TABLET, FILM COATED ORAL
Qty: 90 | Refills: 1 | Status: ACTIVE | COMMUNITY
Start: 2019-08-15 | End: 1900-01-01

## 2024-03-26 LAB
HBA1C MFR BLD HPLC: 8.3
LDLC SERPL DIRECT ASSAY-MCNC: 82
MICROALBUMIN/CREAT 24H UR-RTO: 4
TSH SERPL-ACNC: 2.71

## 2024-03-29 ENCOUNTER — APPOINTMENT (OUTPATIENT)
Dept: ENDOCRINOLOGY | Facility: CLINIC | Age: 79
End: 2024-03-29
Payer: MEDICARE

## 2024-03-29 VITALS
HEART RATE: 88 BPM | BODY MASS INDEX: 31.92 KG/M2 | WEIGHT: 223 LBS | DIASTOLIC BLOOD PRESSURE: 80 MMHG | SYSTOLIC BLOOD PRESSURE: 120 MMHG | OXYGEN SATURATION: 98 % | HEIGHT: 70 IN

## 2024-03-29 DIAGNOSIS — I10 ESSENTIAL (PRIMARY) HYPERTENSION: ICD-10-CM

## 2024-03-29 LAB — GLUCOSE BLDC GLUCOMTR-MCNC: 122

## 2024-03-29 PROCEDURE — 95251 CONT GLUC MNTR ANALYSIS I&R: CPT

## 2024-03-29 PROCEDURE — 99214 OFFICE O/P EST MOD 30 MIN: CPT

## 2024-03-29 PROCEDURE — 82962 GLUCOSE BLOOD TEST: CPT

## 2024-03-29 NOTE — HISTORY OF PRESENT ILLNESS
[FreeTextEntry1] : Interval Hx - overnight hypoglycemia, with improvement since lowering basal insulin , has been adherent with long acting insulin - vegetarian meals, lots of vegetables and fish.  Type 2 DM Severity: uncontrolled Duration: 15+ years Modifying Factors: worse with diet, vegetarian so lots carbs/vegetable and eats fish, 2 meals 1230 pm and 8 pm  Date and time were wrong in settings: Date was 1 month ahead and time was 3 hours behind  SMBG: [employer] 2 CGM CGM downloaded and reviewed: Izabel 2 Average glucose: 172 % time CGM active: 92% Glucose variability (target <36%): 28.7 % VERY HIGH (>250): 6 % HIGH (181-250): 36 % TARGET (): 57 % LOW (54-69): 0 % VERY LOW (<54): 0 Interpretation:date and time was wrong on izabel, since lowering tresiba overnight hypoglycemia is improving   Current drug regimen: he does not know insulin regimens/dosing; misses Novolog dosing radha when out Tresiba 60 units did not want to go to 50 like instructed by Dr. Ashford  Novolog scale  18, 151-200 20, 201-250 22, 251-300 24, 301-350 26, 351-400 28, 401+ 30 Complications: (+) CKD follows with Renal (+) retinopathy follows Dr Villaseñor (retina) , Dr Card (+) neuropathy off gabapentin, (+) gastroparesis CAD - follows w cardio =================================== HLD on Atorvastatin 10 mg daily-adherent

## 2024-03-29 NOTE — REVIEW OF SYSTEMS
[Visual Field Defect] : no visual field defect [Fatigue] : no fatigue [Dysphagia] : no dysphagia [Palpitations] : no palpitations [Chest Pain] : no chest pain [Nausea] : no nausea [Shortness Of Breath] : no shortness of breath [Constipation] : no constipation [Vomiting] : no vomiting [Diarrhea] : no diarrhea [Polyuria] : no polyuria [Polydipsia] : no polydipsia

## 2024-03-29 NOTE — ASSESSMENT
[Diabetes Foot Care] : diabetes foot care [Long Term Vascular Complications] : long term vascular complications of diabetes [Importance of Diet and Exercise] : importance of diet and exercise to improve glycemic control, achieve weight loss and improve cardiovascular health [Exercise/Effect on Glucose] : exercise/effect on glucose [Retinopathy Screening] : Patient was referred to ophthalmology for retinopathy screening [FreeTextEntry1] : 77 yr old male with: Type 2 DM comp CKD, retinopathy and neuropathy -Izabel 2: Interpretation: improved control, some postprandial hyperglycemia then followed by decline in sugar, also with fasting lows. now adherent with insulin! - decrease Tresiba to 56 units, cont aherenct - continue Novolog scale  18, 151-200 20, 201-250 22, 251-300 24, 301-350 26, 351-400 28, 401+ 30 - did not tolerate Jardiance - given gastroparesis, use simple carbs without protein/fat to treat lows - not candidate for GLP1a due to gastroparesis, not candidate for DPP4i due to gastroparesis - repeat labs before next visit - cont renal and retina specialist -Date and time fixed on izabel settings  HLD - Continue statin  Obesity - gained weight, not candidate for GLP1a, encouraged low carb diet  Glucose Sensor Necessity: This patient with diabetes (dx: E11.65) This patient is currently using the Izabel continuous glucose monitor, scanning at least 4 times day. The patient is treated with insulin via 3 or more injections daily. This patient is adjusting their blood glucose based on data from the continuous glucose monitor and uses an insulin sliding scale In addition, the patient has been to our office for an evaluation of their diabetes control within the past 6 months.   RTO in 6 weeks with MD

## 2024-03-31 ENCOUNTER — NON-APPOINTMENT (OUTPATIENT)
Age: 79
End: 2024-03-31

## 2024-04-01 ENCOUNTER — RX RENEWAL (OUTPATIENT)
Age: 79
End: 2024-04-01

## 2024-04-01 RX ORDER — ATORVASTATIN CALCIUM 40 MG/1
40 TABLET, FILM COATED ORAL
Qty: 90 | Refills: 1 | Status: ACTIVE | COMMUNITY
Start: 2024-04-01 | End: 1900-01-01

## 2024-04-02 ENCOUNTER — RX RENEWAL (OUTPATIENT)
Age: 79
End: 2024-04-02

## 2024-04-02 RX ORDER — INSULIN DEGLUDEC INJECTION 100 U/ML
100 INJECTION, SOLUTION SUBCUTANEOUS
Qty: 3 | Refills: 1 | Status: ACTIVE | COMMUNITY
Start: 2019-01-07 | End: 1900-01-01

## 2024-05-10 LAB
HBA1C MFR BLD HPLC: 7.7
LDLC SERPL DIRECT ASSAY-MCNC: 58
MICROALBUMIN/CREAT 24H UR-RTO: 8
TSH SERPL-ACNC: 2.96

## 2024-05-11 ENCOUNTER — APPOINTMENT (OUTPATIENT)
Dept: ENDOCRINOLOGY | Facility: CLINIC | Age: 79
End: 2024-05-11
Payer: MEDICARE

## 2024-05-11 VITALS
RESPIRATION RATE: 16 BRPM | DIASTOLIC BLOOD PRESSURE: 66 MMHG | WEIGHT: 223 LBS | HEART RATE: 59 BPM | SYSTOLIC BLOOD PRESSURE: 122 MMHG | BODY MASS INDEX: 31.92 KG/M2 | HEIGHT: 70 IN | OXYGEN SATURATION: 96 %

## 2024-05-11 DIAGNOSIS — Z79.4 LONG TERM (CURRENT) USE OF INSULIN: ICD-10-CM

## 2024-05-11 DIAGNOSIS — E11.21 TYPE 2 DIABETES MELLITUS WITH DIABETIC NEPHROPATHY: ICD-10-CM

## 2024-05-11 DIAGNOSIS — E11.65 TYPE 2 DIABETES MELLITUS WITH HYPERGLYCEMIA: ICD-10-CM

## 2024-05-11 DIAGNOSIS — E11.319 TYPE 2 DIABETES MELLITUS WITH UNSPECIFIED DIABETIC RETINOPATHY W/OUT MACULAR EDEMA: ICD-10-CM

## 2024-05-11 DIAGNOSIS — E78.5 HYPERLIPIDEMIA, UNSPECIFIED: ICD-10-CM

## 2024-05-11 LAB — GLUCOSE BLDC GLUCOMTR-MCNC: 157

## 2024-05-11 PROCEDURE — 99214 OFFICE O/P EST MOD 30 MIN: CPT

## 2024-05-11 PROCEDURE — 95251 CONT GLUC MNTR ANALYSIS I&R: CPT

## 2024-05-11 PROCEDURE — 82962 GLUCOSE BLOOD TEST: CPT

## 2024-05-11 NOTE — HISTORY OF PRESENT ILLNESS
[FreeTextEntry1] : Interval Hx - reports low overnight - vegetarian meals, lots of vegetables and fish.    Type 2 DM Severity: uncontrolled  Duration: 15+ years Modifying Factors: worse with diet, vegetarian so lots carbs/vegetable and eats fish, 2 meals 1230 pm and 8 pm  SMBG: [employer] 2 CGM CGM downloaded and reviewed: [employer] Average glucose: 173 % time CGM active: 96% Glucose variability (target <36%): 37 % VERY HIGH (>250): 10 % HIGH (181-250): 34 % TARGET (): 53 % LOW (54-69): 2 % VERY LOW (<54): 1 Interpretation: increased low,  some postprandial hyperglycemia then followed by decline in sugar,  Current drug regimen:  he does not know insulin regimens/dosing; misses Novolog dosing radha when out Tresiba to 65 units (self decreased dose due to lows) Novolog scale  18, 151-200 20, 201-250 22, 251-300 24, 301-350 26, 351-400 28, 401+ 30 Complications:  (+) CKD follows with Renal (+) retinopathy follows Dr Villaseñor (retina) , Dr Card (+) neuropathy off gabapentin, (+) gastroparesis CAD - follows w cardio =================================== HLD on Atorvastatin daily-adherent but does not know the dose

## 2024-05-11 NOTE — ASSESSMENT
[FreeTextEntry1] : 77 yr old male with: Type 2 DM comp CKD, retinopathy and neuropathy -Izabel 2: Interpretation: fasting hypoglycemia, post prandial hyperglycemia - decrease Tresiba to 52 units -  increase  Novolog scale  22, 151-200 24, 201-250 26, 251-300 28, 301-350 30, 351-400 32, 401+ 34; try taking 20 min before meals - did not tolerate Jardiance - given gastroparesis, use simple carbs without protein/fat to treat lows - not candidate for GLP1a due to gastroparesis, not candidate for DPP4i due to gastroparesis - repeat labs 3 months - cont renal and retina specialist  HLD - Continue statin  Obesity - gained weight, not candidate for GLP1a, encouraged low carb diet  Glucose Sensor Necessity:  This patient with diabetes (dx:  E11.65) This patient is currently using the Izabel continuous glucose monitor, scanning at least 4 times day.  The patient is treated with insulin via 3 or more injections daily.   This patient is adjusting their blood glucose based on data from the continuous glucose monitor and uses an insulin sliding scale In addition, the patient has been to our office for an evaluation of their diabetes control within the past 6 months.

## 2024-05-11 NOTE — DATA REVIEWED
[FreeTextEntry1] : Labs:  10/16/20 A1C 10.2% Creatinine 1.48 GFR 47 Cholesterol 196 Triglycerides 185 Vitamin D 26.9  labs 1/11/21 GFR 54.   Labs 10/25/21 Gluc 300 Cr 1.46, GFR 47 LDL 83, HDL 38, Tg 142 A1c 11.5  "Labs 6/16/22 Cr 1.86 - f/u renal LDL chol 98, Tg 172 - elevated, watch diet, increase atorvastatin to 20 mg nightly normal thyroid urine alb negative A1c 10.9 - too high"  Labs 10/2022 , trig 136, HDL 45 A1c 10%, fructosamine 351  labs 3/7/23 Cr 1.84, GFR 38 LDL 57 TSh 2.52 A1c 9.4 urine alb/Cr neg  Labs 7/27/23 Cr 2.46, GFR 27, A1c 11.3 LDL 72  Labs 8/31/23 Cr 1.95, GFR 35 TSH 1.69, neg TPO ab  Labs 10/18/23 Gluc 300, A1c 10.1 Cr 1.79, GFR 39 LDL 76, HDL 46, Trgi 162 TSH 2.61  Labs 4/29/24 Gluc 127, A1c 7.7 Cr 1.52, GFR 47 LDL 58, HDL 39, Trig 87 TSH 2.96, FT4 1.23, Ft3 3.12 urine alb/cr 11

## 2024-05-11 NOTE — REVIEW OF SYSTEMS
[Recent Weight Gain (___ Lbs)] : recent weight gain: [unfilled] lbs [Blurred Vision] : blurred vision [Polyuria] : polyuria [Nocturia] : nocturia [Pain/Numbness of Digits] : pain/numbness of digits [Chest Pain] : no chest pain [Shortness Of Breath] : no shortness of breath [Nausea] : no nausea [Abdominal Pain] : no abdominal pain [Polydipsia] : no polydipsia

## 2024-05-11 NOTE — PHYSICAL EXAM
[Alert] : alert [Obese] : obese [No Acute Distress] : no acute distress [EOMI] : extra ocular movement intact [No Respiratory Distress] : no respiratory distress [Clear to Auscultation] : lungs were clear to auscultation bilaterally [Normal S1, S2] : normal S1 and S2 [Normal Rate] : heart rate was normal [Oriented x3] : oriented to person, place, and time [Normal Affect] : the affect was normal [Normal Insight/Judgement] : insight and judgment were intact [Normal Mood] : the mood was normal

## 2024-05-13 ENCOUNTER — OFFICE (OUTPATIENT)
Dept: URBAN - METROPOLITAN AREA CLINIC 103 | Facility: CLINIC | Age: 79
Setting detail: OPHTHALMOLOGY
End: 2024-05-13
Payer: MEDICARE

## 2024-05-13 ENCOUNTER — RX ONLY (RX ONLY)
Age: 79
End: 2024-05-13

## 2024-05-13 DIAGNOSIS — H16.103: ICD-10-CM

## 2024-05-13 DIAGNOSIS — H16.223: ICD-10-CM

## 2024-05-13 DIAGNOSIS — H40.1231: ICD-10-CM

## 2024-05-13 DIAGNOSIS — E11.3293: ICD-10-CM

## 2024-05-13 DIAGNOSIS — H35.033: ICD-10-CM

## 2024-05-13 PROCEDURE — 92012 INTRM OPH EXAM EST PATIENT: CPT | Performed by: OPHTHALMOLOGY

## 2024-05-13 PROCEDURE — 92083 EXTENDED VISUAL FIELD XM: CPT | Performed by: OPHTHALMOLOGY

## 2024-05-13 ASSESSMENT — CONFRONTATIONAL VISUAL FIELD TEST (CVF)
OD_FINDINGS: FULL
OS_FINDINGS: FULL

## 2024-08-24 ENCOUNTER — APPOINTMENT (OUTPATIENT)
Dept: ENDOCRINOLOGY | Facility: CLINIC | Age: 79
End: 2024-08-24
Payer: MEDICARE

## 2024-08-24 VITALS
HEART RATE: 74 BPM | DIASTOLIC BLOOD PRESSURE: 68 MMHG | WEIGHT: 213 LBS | OXYGEN SATURATION: 96 % | BODY MASS INDEX: 30.49 KG/M2 | SYSTOLIC BLOOD PRESSURE: 118 MMHG | RESPIRATION RATE: 16 BRPM | HEIGHT: 70 IN

## 2024-08-24 DIAGNOSIS — E11.65 TYPE 2 DIABETES MELLITUS WITH HYPERGLYCEMIA: ICD-10-CM

## 2024-08-24 DIAGNOSIS — E78.5 HYPERLIPIDEMIA, UNSPECIFIED: ICD-10-CM

## 2024-08-24 DIAGNOSIS — E11.319 TYPE 2 DIABETES MELLITUS WITH UNSPECIFIED DIABETIC RETINOPATHY W/OUT MACULAR EDEMA: ICD-10-CM

## 2024-08-24 DIAGNOSIS — E11.21 TYPE 2 DIABETES MELLITUS WITH DIABETIC NEPHROPATHY: ICD-10-CM

## 2024-08-24 DIAGNOSIS — Z79.4 LONG TERM (CURRENT) USE OF INSULIN: ICD-10-CM

## 2024-08-24 LAB — GLUCOSE BLDC GLUCOMTR-MCNC: 124

## 2024-08-24 PROCEDURE — 99214 OFFICE O/P EST MOD 30 MIN: CPT

## 2024-08-24 PROCEDURE — 82962 GLUCOSE BLOOD TEST: CPT

## 2024-08-24 PROCEDURE — 95251 CONT GLUC MNTR ANALYSIS I&R: CPT

## 2024-08-24 NOTE — HISTORY OF PRESENT ILLNESS
[FreeTextEntry1] : Interval Hx - labs done yesterday at lab - difficult past 3 months due to wife medical issues but now resolved; back/forth from hospital and missing insulin doses at times - vegetarian meals, lots of vegetables and fish. eats 2 meals daily - self increased Tresiba to 70  SMBG - using Izabel 2 high 42%, target 58%, no lows variable numbers with post meal hyperglycemia, some lows overnight and midday  Type 2 DM Severity: uncontrolled  Duration: 15+ years Modifying Factors: worse with diet, vegetarian so lots carbs/vegetable and eats fish, 2 meals 1230 pm and 8 pm - not candidate for GLP1a due to gastroparesis, not candidate for DPP4i due to gastroparesis  Past meds: did not tolerate Jardiance  Current drug regimen:  he does not know insulin regimens/dosing; misses Novolog dosing radha when out - Tresiba 70 units  - Novolog scale  22, 151-200 24, 201-250 26, 251-300 28, 301-350 30, 351-400 32, 401+ 34  Complications:  (+) CKD follows with Renal, neg urine alb/cr (+) retinopathy follows Dr Villaseñor (retina) , Dr Card (+) neuropathy off gabapentin, (+) gastroparesis CAD - follows w cardio =================================== HLD on Atorvastatin daily-adherent but does not know the dose

## 2024-08-24 NOTE — ASSESSMENT
[FreeTextEntry1] : 78 yr old male with: Type 2 DM comp CKD, retinopathy and neuropathy -Izabel 2: Interpretation: variable numbers with post meal hyperglycemia, some lows overnight and midday - decrease Tresiba to 62 units -  increase  Lunch Novolog scale  24, 151-200 26, 201-250 28, 251-300 30, 301-350 32, 351-400 34, 401+ 36 - cont Dinner Novolog scale:   22, 151-200 24, 201-250 26, 251-300 28, 301-350 30, 351-400 32, 401+ 34 - did not tolerate Jardiance - given gastroparesis, use simple carbs without protein/fat to treat lows - not candidate for GLP1a due to gastroparesis, not candidate for DPP4i due to gastroparesis - repeat labs 3 months - recent labs pending - cont renal and retina specialist  HLD - Continue statin  Obesity - losing weight, not candidate for GLP1a, encouraged low carb diet and exercise  Glucose Sensor Necessity:  This patient with diabetes (dx:  E11.65) This patient is currently using the Izabel continuous glucose monitor, scanning at least 4 times day.  The patient is treated with insulin via 3 or more injections daily.   This patient is adjusting their blood glucose based on data from the continuous glucose monitor and uses an insulin sliding scale In addition, the patient has been to our office for an evaluation of their diabetes control within the past 6 months.

## 2024-08-24 NOTE — PHYSICAL EXAM
[Alert] : alert [Obese] : obese [No Acute Distress] : no acute distress [EOMI] : extra ocular movement intact [Clear to Auscultation] : lungs were clear to auscultation bilaterally [Normal S1, S2] : normal S1 and S2 [Normal Rate] : heart rate was normal [Oriented x3] : oriented to person, place, and time [Normal Affect] : the affect was normal [Normal Insight/Judgement] : insight and judgment were intact [Normal Mood] : the mood was normal [Normal Rate and Effort] : normal respiratory rate and effort [Acanthosis Nigricans] : no acanthosis nigricans

## 2024-08-24 NOTE — REVIEW OF SYSTEMS
[Blurred Vision] : blurred vision [Polyuria] : polyuria [Nocturia] : nocturia [Pain/Numbness of Digits] : pain/numbness of digits [Fatigue] : fatigue [Recent Weight Loss (___ Lbs)] : recent weight loss: [unfilled] lbs [Chest Pain] : no chest pain [Shortness Of Breath] : no shortness of breath [Nausea] : no nausea [Abdominal Pain] : no abdominal pain [Polydipsia] : no polydipsia

## 2024-09-20 ENCOUNTER — RX RENEWAL (OUTPATIENT)
Age: 79
End: 2024-09-20

## 2024-09-23 ENCOUNTER — OFFICE (OUTPATIENT)
Dept: URBAN - METROPOLITAN AREA CLINIC 103 | Facility: CLINIC | Age: 79
Setting detail: OPHTHALMOLOGY
End: 2024-09-23
Payer: MEDICARE

## 2024-09-23 DIAGNOSIS — H40.1231: ICD-10-CM

## 2024-09-23 DIAGNOSIS — E11.3293: ICD-10-CM

## 2024-09-23 DIAGNOSIS — H16.223: ICD-10-CM

## 2024-09-23 DIAGNOSIS — Z96.1: ICD-10-CM

## 2024-09-23 DIAGNOSIS — H16.103: ICD-10-CM

## 2024-09-23 DIAGNOSIS — H35.033: ICD-10-CM

## 2024-09-23 PROCEDURE — 92133 CPTRZD OPH DX IMG PST SGM ON: CPT | Performed by: OPHTHALMOLOGY

## 2024-09-23 PROCEDURE — 92014 COMPRE OPH EXAM EST PT 1/>: CPT | Performed by: OPHTHALMOLOGY

## 2024-09-23 ASSESSMENT — CONFRONTATIONAL VISUAL FIELD TEST (CVF)
OD_FINDINGS: FULL
OS_FINDINGS: FULL

## 2024-10-02 ENCOUNTER — RX RENEWAL (OUTPATIENT)
Age: 79
End: 2024-10-02

## 2024-10-02 PROBLEM — B19.9 UNSPECIFIED VIRAL HEPATITIS WITHOUT HEPATIC COMA: Status: ACTIVE | Noted: 2024-10-02

## 2024-10-02 PROBLEM — E27.8 OTHER SPECIFIED DISORDERS OF ADRENAL GLAND: Status: ACTIVE | Noted: 2024-10-02

## 2024-10-02 PROBLEM — N17.9 ACUTE KIDNEY FAILURE, UNSPECIFIED: Status: ACTIVE | Noted: 2024-10-02

## 2024-10-02 PROBLEM — Z82.49 FAMILY HISTORY OF HYPERTENSION: Status: ACTIVE | Noted: 2024-10-02

## 2024-10-02 PROBLEM — N18.32 STAGE 3B CHRONIC KIDNEY DISEASE: Status: ACTIVE | Noted: 2024-10-02

## 2024-10-02 PROBLEM — N28.89 OTHER SPECIFIED DISORDERS OF KIDNEY AND URETER: Status: ACTIVE | Noted: 2024-10-02

## 2024-10-02 PROBLEM — R80.9 PROTEINURIA, UNSPECIFIED: Status: ACTIVE | Noted: 2024-10-02

## 2024-12-04 ENCOUNTER — NON-APPOINTMENT (OUTPATIENT)
Age: 79
End: 2024-12-04

## 2024-12-04 DIAGNOSIS — Z78.9 OTHER SPECIFIED HEALTH STATUS: ICD-10-CM

## 2024-12-20 LAB
HBA1C MFR BLD HPLC: 8.3
LDLC SERPL DIRECT ASSAY-MCNC: 79

## 2024-12-21 ENCOUNTER — APPOINTMENT (OUTPATIENT)
Dept: ENDOCRINOLOGY | Facility: CLINIC | Age: 79
End: 2024-12-21
Payer: MEDICARE

## 2024-12-21 DIAGNOSIS — E11.65 TYPE 2 DIABETES MELLITUS WITH HYPERGLYCEMIA: ICD-10-CM

## 2024-12-21 DIAGNOSIS — Z79.4 LONG TERM (CURRENT) USE OF INSULIN: ICD-10-CM

## 2024-12-21 DIAGNOSIS — E11.21 TYPE 2 DIABETES MELLITUS WITH DIABETIC NEPHROPATHY: ICD-10-CM

## 2024-12-21 DIAGNOSIS — E11.319 TYPE 2 DIABETES MELLITUS WITH UNSPECIFIED DIABETIC RETINOPATHY W/OUT MACULAR EDEMA: ICD-10-CM

## 2024-12-21 DIAGNOSIS — E78.5 HYPERLIPIDEMIA, UNSPECIFIED: ICD-10-CM

## 2024-12-21 PROCEDURE — 99443: CPT | Mod: 93

## 2024-12-24 ENCOUNTER — NON-APPOINTMENT (OUTPATIENT)
Age: 79
End: 2024-12-24

## 2024-12-24 PROCEDURE — 95251 CONT GLUC MNTR ANALYSIS I&R: CPT

## 2025-01-02 ENCOUNTER — RX RENEWAL (OUTPATIENT)
Age: 80
End: 2025-01-02

## 2025-01-07 ENCOUNTER — RX RENEWAL (OUTPATIENT)
Age: 80
End: 2025-01-07

## 2025-01-10 ENCOUNTER — APPOINTMENT (OUTPATIENT)
Age: 80
End: 2025-01-10

## 2025-01-27 ENCOUNTER — APPOINTMENT (OUTPATIENT)
Age: 80
End: 2025-01-27
Payer: MEDICARE

## 2025-01-27 VITALS
DIASTOLIC BLOOD PRESSURE: 81 MMHG | BODY MASS INDEX: 30.78 KG/M2 | HEIGHT: 70 IN | SYSTOLIC BLOOD PRESSURE: 175 MMHG | WEIGHT: 215 LBS | HEART RATE: 76 BPM

## 2025-01-27 VITALS — SYSTOLIC BLOOD PRESSURE: 125 MMHG | DIASTOLIC BLOOD PRESSURE: 60 MMHG

## 2025-01-27 DIAGNOSIS — R80.9 PROTEINURIA, UNSPECIFIED: ICD-10-CM

## 2025-01-27 DIAGNOSIS — E78.5 HYPERLIPIDEMIA, UNSPECIFIED: ICD-10-CM

## 2025-01-27 DIAGNOSIS — I10 ESSENTIAL (PRIMARY) HYPERTENSION: ICD-10-CM

## 2025-01-27 DIAGNOSIS — N18.32 CHRONIC KIDNEY DISEASE, STAGE 3B: ICD-10-CM

## 2025-01-27 PROCEDURE — 99204 OFFICE O/P NEW MOD 45 MIN: CPT

## 2025-01-27 PROCEDURE — 99214 OFFICE O/P EST MOD 30 MIN: CPT

## 2025-02-10 ENCOUNTER — OFFICE (OUTPATIENT)
Dept: URBAN - METROPOLITAN AREA CLINIC 103 | Facility: CLINIC | Age: 80
Setting detail: OPHTHALMOLOGY
End: 2025-02-10
Payer: MEDICARE

## 2025-02-10 DIAGNOSIS — Z96.1: ICD-10-CM

## 2025-02-10 DIAGNOSIS — H40.1231: ICD-10-CM

## 2025-02-10 DIAGNOSIS — H35.033: ICD-10-CM

## 2025-02-10 DIAGNOSIS — H16.103: ICD-10-CM

## 2025-02-10 DIAGNOSIS — E11.3293: ICD-10-CM

## 2025-02-10 DIAGNOSIS — H16.223: ICD-10-CM

## 2025-02-10 PROCEDURE — 92250 FUNDUS PHOTOGRAPHY W/I&R: CPT | Performed by: OPHTHALMOLOGY

## 2025-02-10 PROCEDURE — 92020 GONIOSCOPY: CPT | Performed by: OPHTHALMOLOGY

## 2025-02-10 PROCEDURE — 92014 COMPRE OPH EXAM EST PT 1/>: CPT | Performed by: OPHTHALMOLOGY

## 2025-02-10 ASSESSMENT — KERATOMETRY
OS_AXISANGLE_DEGREES: 075
OD_K1POWER_DIOPTERS: 41.75
OS_K1POWER_DIOPTERS: 41.50
OD_K2POWER_DIOPTERS: 42.25
OD_AXISANGLE_DEGREES: 118
OS_K2POWER_DIOPTERS: 42.25

## 2025-02-10 ASSESSMENT — REFRACTION_MANIFEST
OD_VA1: 20/30+1
OD_ADD: +2.50
OS_AXIS: 180
OD_AXIS: 035
OS_SPHERE: PLANO
OD_SPHERE: -0.25
OS_VA1: 20/25-1
OS_ADD: +2.50
OS_CYLINDER: -0.25
OD_CYLINDER: -0.50

## 2025-02-10 ASSESSMENT — VISUAL ACUITY
OS_BCVA: 20/25-2
OD_BCVA: 20/20-1

## 2025-02-10 ASSESSMENT — PACHYMETRY
OD_CT_UM: 475
OD_CT_CORRECTION: 5
OS_CT_UM: 475
OS_CT_CORRECTION: 5

## 2025-02-10 ASSESSMENT — REFRACTION_AUTOREFRACTION
OS_SPHERE: UTP
OD_SPHERE: UTP

## 2025-02-10 ASSESSMENT — CONFRONTATIONAL VISUAL FIELD TEST (CVF)
OS_FINDINGS: FULL
OD_FINDINGS: FULL

## 2025-02-10 ASSESSMENT — SUPERFICIAL PUNCTATE KERATITIS (SPK)
OD_SPK: 1+
OS_SPK: T

## 2025-02-19 ENCOUNTER — RX RENEWAL (OUTPATIENT)
Age: 80
End: 2025-02-19

## 2025-02-28 ENCOUNTER — APPOINTMENT (OUTPATIENT)
Age: 80
End: 2025-02-28
Payer: MEDICARE

## 2025-02-28 VITALS
HEART RATE: 85 BPM | HEIGHT: 70 IN | SYSTOLIC BLOOD PRESSURE: 169 MMHG | WEIGHT: 220 LBS | DIASTOLIC BLOOD PRESSURE: 82 MMHG | BODY MASS INDEX: 31.5 KG/M2

## 2025-02-28 DIAGNOSIS — R80.9 PROTEINURIA, UNSPECIFIED: ICD-10-CM

## 2025-02-28 DIAGNOSIS — E78.5 HYPERLIPIDEMIA, UNSPECIFIED: ICD-10-CM

## 2025-02-28 DIAGNOSIS — N18.32 CHRONIC KIDNEY DISEASE, STAGE 3B: ICD-10-CM

## 2025-02-28 DIAGNOSIS — I10 ESSENTIAL (PRIMARY) HYPERTENSION: ICD-10-CM

## 2025-02-28 PROCEDURE — 99214 OFFICE O/P EST MOD 30 MIN: CPT

## 2025-02-28 RX ORDER — NIFEDIPINE 60 MG/1
60 TABLET, EXTENDED RELEASE ORAL DAILY
Qty: 90 | Refills: 3 | Status: ACTIVE | COMMUNITY
Start: 2025-02-28 | End: 1900-01-01

## 2025-03-12 LAB
HBA1C MFR BLD HPLC: 8.4
MICROALBUMIN/CREAT 24H UR-RTO: 0.1

## 2025-03-15 ENCOUNTER — APPOINTMENT (OUTPATIENT)
Dept: ENDOCRINOLOGY | Facility: CLINIC | Age: 80
End: 2025-03-15
Payer: MEDICARE

## 2025-03-15 VITALS
HEART RATE: 70 BPM | HEIGHT: 70 IN | OXYGEN SATURATION: 88 % | DIASTOLIC BLOOD PRESSURE: 70 MMHG | BODY MASS INDEX: 31.5 KG/M2 | SYSTOLIC BLOOD PRESSURE: 126 MMHG | WEIGHT: 220 LBS

## 2025-03-15 DIAGNOSIS — E11.65 TYPE 2 DIABETES MELLITUS WITH HYPERGLYCEMIA: ICD-10-CM

## 2025-03-15 DIAGNOSIS — R09.02 HYPOXEMIA: ICD-10-CM

## 2025-03-15 DIAGNOSIS — E11.21 TYPE 2 DIABETES MELLITUS WITH DIABETIC NEPHROPATHY: ICD-10-CM

## 2025-03-15 DIAGNOSIS — E11.319 TYPE 2 DIABETES MELLITUS WITH UNSPECIFIED DIABETIC RETINOPATHY W/OUT MACULAR EDEMA: ICD-10-CM

## 2025-03-15 DIAGNOSIS — E78.5 HYPERLIPIDEMIA, UNSPECIFIED: ICD-10-CM

## 2025-03-15 DIAGNOSIS — Z79.4 LONG TERM (CURRENT) USE OF INSULIN: ICD-10-CM

## 2025-03-15 LAB — GLUCOSE BLDC GLUCOMTR-MCNC: 254

## 2025-03-15 PROCEDURE — 99215 OFFICE O/P EST HI 40 MIN: CPT

## 2025-03-15 PROCEDURE — 95251 CONT GLUC MNTR ANALYSIS I&R: CPT

## 2025-03-15 PROCEDURE — 82962 GLUCOSE BLOOD TEST: CPT

## 2025-03-28 ENCOUNTER — APPOINTMENT (OUTPATIENT)
Age: 80
End: 2025-03-28
Payer: MEDICARE

## 2025-03-28 VITALS
SYSTOLIC BLOOD PRESSURE: 114 MMHG | HEIGHT: 70 IN | WEIGHT: 220 LBS | DIASTOLIC BLOOD PRESSURE: 68 MMHG | HEART RATE: 45 BPM | BODY MASS INDEX: 31.5 KG/M2

## 2025-03-28 DIAGNOSIS — N18.32 CHRONIC KIDNEY DISEASE, STAGE 3B: ICD-10-CM

## 2025-03-28 DIAGNOSIS — I10 ESSENTIAL (PRIMARY) HYPERTENSION: ICD-10-CM

## 2025-03-28 DIAGNOSIS — R80.9 PROTEINURIA, UNSPECIFIED: ICD-10-CM

## 2025-03-28 PROCEDURE — 99214 OFFICE O/P EST MOD 30 MIN: CPT

## 2025-04-05 ENCOUNTER — NON-APPOINTMENT (OUTPATIENT)
Age: 80
End: 2025-04-05

## 2025-04-08 ENCOUNTER — RX RENEWAL (OUTPATIENT)
Age: 80
End: 2025-04-08

## 2025-04-08 RX ORDER — FLASH GLUCOSE SENSOR
KIT MISCELLANEOUS
Qty: 6 | Refills: 3 | Status: ACTIVE | COMMUNITY
Start: 2025-04-04 | End: 1900-01-01

## 2025-04-25 ENCOUNTER — APPOINTMENT (OUTPATIENT)
Dept: ENDOCRINOLOGY | Facility: CLINIC | Age: 80
End: 2025-04-25

## 2025-04-25 VITALS
OXYGEN SATURATION: 90 % | HEIGHT: 70 IN | DIASTOLIC BLOOD PRESSURE: 64 MMHG | BODY MASS INDEX: 32.5 KG/M2 | WEIGHT: 227 LBS | SYSTOLIC BLOOD PRESSURE: 126 MMHG | HEART RATE: 72 BPM

## 2025-04-25 DIAGNOSIS — I10 ESSENTIAL (PRIMARY) HYPERTENSION: ICD-10-CM

## 2025-04-25 DIAGNOSIS — E11.65 TYPE 2 DIABETES MELLITUS WITH HYPERGLYCEMIA: ICD-10-CM

## 2025-04-25 DIAGNOSIS — E78.5 HYPERLIPIDEMIA, UNSPECIFIED: ICD-10-CM

## 2025-04-25 LAB — GLUCOSE BLDC GLUCOMTR-MCNC: 236

## 2025-04-25 PROCEDURE — G2211 COMPLEX E/M VISIT ADD ON: CPT

## 2025-04-25 PROCEDURE — 99214 OFFICE O/P EST MOD 30 MIN: CPT

## 2025-04-25 PROCEDURE — 82962 GLUCOSE BLOOD TEST: CPT

## 2025-04-25 PROCEDURE — 95251 CONT GLUC MNTR ANALYSIS I&R: CPT

## 2025-04-28 LAB
HBA1C MFR BLD HPLC: 9
LDLC SERPL DIRECT ASSAY-MCNC: 58

## 2025-06-05 ENCOUNTER — APPOINTMENT (OUTPATIENT)
Dept: ENDOCRINOLOGY | Facility: CLINIC | Age: 80
End: 2025-06-05

## 2025-06-09 ENCOUNTER — OFFICE (OUTPATIENT)
Dept: URBAN - METROPOLITAN AREA CLINIC 103 | Facility: CLINIC | Age: 80
Setting detail: OPHTHALMOLOGY
End: 2025-06-09
Payer: MEDICARE

## 2025-06-09 DIAGNOSIS — H16.103: ICD-10-CM

## 2025-06-09 DIAGNOSIS — H35.033: ICD-10-CM

## 2025-06-09 DIAGNOSIS — E11.3293: ICD-10-CM

## 2025-06-09 DIAGNOSIS — H16.223: ICD-10-CM

## 2025-06-09 DIAGNOSIS — Z96.1: ICD-10-CM

## 2025-06-09 DIAGNOSIS — H40.1231: ICD-10-CM

## 2025-06-09 PROCEDURE — 92012 INTRM OPH EXAM EST PATIENT: CPT | Performed by: OPHTHALMOLOGY

## 2025-06-09 PROCEDURE — 92083 EXTENDED VISUAL FIELD XM: CPT | Performed by: OPHTHALMOLOGY

## 2025-06-09 ASSESSMENT — REFRACTION_MANIFEST
OS_AXIS: 180
OS_ADD: +2.50
OD_ADD: +2.50
OD_VA1: 20/30+1
OS_SPHERE: PLANO
OD_CYLINDER: -0.50
OD_SPHERE: -0.25
OS_CYLINDER: -0.25
OD_AXIS: 035
OS_VA1: 20/25-1

## 2025-06-09 ASSESSMENT — PACHYMETRY
OD_CT_CORRECTION: 5
OS_CT_UM: 475
OS_CT_CORRECTION: 5
OD_CT_UM: 475

## 2025-06-09 ASSESSMENT — KERATOMETRY
OS_K2POWER_DIOPTERS: 42.25
OS_K1POWER_DIOPTERS: 41.50
OD_K1POWER_DIOPTERS: 41.75
OD_AXISANGLE_DEGREES: 118
OS_AXISANGLE_DEGREES: 075
OD_K2POWER_DIOPTERS: 42.25

## 2025-06-09 ASSESSMENT — CONFRONTATIONAL VISUAL FIELD TEST (CVF)
OS_FINDINGS: FULL
OD_FINDINGS: FULL

## 2025-06-09 ASSESSMENT — SUPERFICIAL PUNCTATE KERATITIS (SPK)
OS_SPK: T
OD_SPK: 1+

## 2025-06-09 ASSESSMENT — REFRACTION_AUTOREFRACTION
OD_SPHERE: UTP
OS_SPHERE: UTP

## 2025-06-09 ASSESSMENT — VISUAL ACUITY
OD_BCVA: 20/25-1
OS_BCVA: 20/25-3

## 2025-06-30 ENCOUNTER — RX RENEWAL (OUTPATIENT)
Age: 80
End: 2025-06-30

## 2025-07-11 ENCOUNTER — APPOINTMENT (OUTPATIENT)
Age: 80
End: 2025-07-11
Payer: MEDICARE

## 2025-07-11 VITALS
BODY MASS INDEX: 32.21 KG/M2 | DIASTOLIC BLOOD PRESSURE: 80 MMHG | HEART RATE: 81 BPM | SYSTOLIC BLOOD PRESSURE: 178 MMHG | HEIGHT: 70 IN | WEIGHT: 225 LBS

## 2025-07-11 PROCEDURE — 99214 OFFICE O/P EST MOD 30 MIN: CPT

## 2025-07-11 RX ORDER — AMLODIPINE BESYLATE 5 MG/1
5 TABLET ORAL DAILY
Refills: 0 | Status: ACTIVE | COMMUNITY

## 2025-07-11 RX ORDER — ATORVASTATIN CALCIUM 20 MG/1
20 TABLET, FILM COATED ORAL DAILY
Refills: 0 | Status: ACTIVE | COMMUNITY

## 2025-07-11 RX ORDER — BRIMONIDINE TARTRATE, TIMOLOL MALEATE 2; 5 MG/ML; MG/ML
0.2-0.5 SOLUTION/ DROPS TOPICAL
Refills: 0 | Status: ACTIVE | COMMUNITY

## 2025-07-11 RX ORDER — VALSARTAN AND HYDROCHLOROTHIAZIDE 320; 25 MG/1; MG/1
320-25 TABLET, FILM COATED ORAL DAILY
Refills: 0 | Status: ACTIVE | COMMUNITY

## 2025-08-01 ENCOUNTER — APPOINTMENT (OUTPATIENT)
Dept: ENDOCRINOLOGY | Facility: CLINIC | Age: 80
End: 2025-08-01

## 2025-08-08 ENCOUNTER — APPOINTMENT (OUTPATIENT)
Dept: ENDOCRINOLOGY | Facility: CLINIC | Age: 80
End: 2025-08-08
Payer: MEDICARE

## 2025-08-08 VITALS
OXYGEN SATURATION: 94 % | BODY MASS INDEX: 31.64 KG/M2 | DIASTOLIC BLOOD PRESSURE: 80 MMHG | SYSTOLIC BLOOD PRESSURE: 140 MMHG | HEART RATE: 81 BPM | HEIGHT: 70 IN | WEIGHT: 221 LBS

## 2025-08-08 DIAGNOSIS — E78.5 HYPERLIPIDEMIA, UNSPECIFIED: ICD-10-CM

## 2025-08-08 DIAGNOSIS — E11.65 TYPE 2 DIABETES MELLITUS WITH HYPERGLYCEMIA: ICD-10-CM

## 2025-08-08 LAB
GLUCOSE BLDC GLUCOMTR-MCNC: 209
HBA1C MFR BLD HPLC: 9.6

## 2025-08-08 PROCEDURE — 83036 HEMOGLOBIN GLYCOSYLATED A1C: CPT | Mod: QW

## 2025-08-08 PROCEDURE — 95251 CONT GLUC MNTR ANALYSIS I&R: CPT

## 2025-08-08 PROCEDURE — 99214 OFFICE O/P EST MOD 30 MIN: CPT

## 2025-08-08 PROCEDURE — 82962 GLUCOSE BLOOD TEST: CPT

## 2025-08-21 ENCOUNTER — RX RENEWAL (OUTPATIENT)
Age: 80
End: 2025-08-21

## 2025-09-05 ENCOUNTER — APPOINTMENT (OUTPATIENT)
Dept: ENDOCRINOLOGY | Facility: CLINIC | Age: 80
End: 2025-09-05
Payer: MEDICARE

## 2025-09-05 DIAGNOSIS — E11.65 TYPE 2 DIABETES MELLITUS WITH HYPERGLYCEMIA: ICD-10-CM

## 2025-09-05 PROCEDURE — G0108 DIAB MANAGE TRN  PER INDIV: CPT

## 2025-09-18 ENCOUNTER — RX RENEWAL (OUTPATIENT)
Age: 80
End: 2025-09-18

## (undated) DEVICE — SENSOR O2 FINGER ADULT

## (undated) DEVICE — SOL BAG NS 0.9% 1000ML

## (undated) DEVICE — WARMING BLANKET FULL ADULT

## (undated) DEVICE — SSH-EUS RADIAL 1810834: Type: DURABLE MEDICAL EQUIPMENT

## (undated) DEVICE — MASK PROC EAR LOOP

## (undated) DEVICE — SYR SLIP 10CC

## (undated) DEVICE — SYR LUER SLIP TIP 50CC

## (undated) DEVICE — FORCEP RADIAL JAW 4 W NDL 2.4MM 2.8MM 240CM ORANGE DISP

## (undated) DEVICE — UNDERPAD LINEN SAVER 23 X 36"

## (undated) DEVICE — TUBING IV EXTENSION MACRO W CLAVE 7"

## (undated) DEVICE — DENTURE CUP PINK

## (undated) DEVICE — DRSG 2X2

## (undated) DEVICE — TUBING ALARIS PUMP MODULE NON-DEHP

## (undated) DEVICE — DRSG CURITY GAUZE SPONGE 4 X 4" 12-PLY NON-STERILE

## (undated) DEVICE — SOL IRR BAG NS 0.9% 1000ML

## (undated) DEVICE — GOWN IMPERV XL

## (undated) DEVICE — SOL IRR BAG H2O 1000ML

## (undated) DEVICE — BITE BLOCK ADULT 20 X 27MM (GREEN)

## (undated) DEVICE — CATH IV SAFE BC 22G X 1" (BLUE)

## (undated) DEVICE — PACK IV START WITH CHG

## (undated) DEVICE — SYR IV FLUSH SALINE 10ML 30/TY

## (undated) DEVICE — VENODYNE/SCD SLEEVE CALF MEDIUM